# Patient Record
Sex: MALE | Race: BLACK OR AFRICAN AMERICAN | NOT HISPANIC OR LATINO | Employment: FULL TIME | URBAN - METROPOLITAN AREA
[De-identification: names, ages, dates, MRNs, and addresses within clinical notes are randomized per-mention and may not be internally consistent; named-entity substitution may affect disease eponyms.]

---

## 2017-01-21 ENCOUNTER — HOSPITAL ENCOUNTER (EMERGENCY)
Facility: HOSPITAL | Age: 32
Discharge: HOME | End: 2017-01-21
Attending: EMERGENCY MEDICINE

## 2017-01-21 VITALS
WEIGHT: 190 LBS | OXYGEN SATURATION: 99 % | RESPIRATION RATE: 18 BRPM | TEMPERATURE: 98 F | HEART RATE: 67 BPM | HEIGHT: 67 IN | BODY MASS INDEX: 29.82 KG/M2

## 2017-01-21 DIAGNOSIS — S81.812A LACERATION OF LEFT LEG, INITIAL ENCOUNTER: Primary | ICD-10-CM

## 2017-01-21 PROCEDURE — 99283 EMERGENCY DEPT VISIT LOW MDM: CPT

## 2017-01-21 RX ORDER — BACITRACIN, NEOMYCIN, POLYMYXIN B 400; 3.5; 5 [USP'U]/G; MG/G; [USP'U]/G
1 OINTMENT TOPICAL ONCE
Status: COMPLETED | OUTPATIENT
Start: 2017-01-21 | End: 2017-01-21

## 2017-01-21 RX ORDER — LIDOCAINE HYDROCHLORIDE AND EPINEPHRINE 10; 10 MG/ML; UG/ML
1 INJECTION, SOLUTION INFILTRATION; PERINEURAL ONCE
Status: COMPLETED | OUTPATIENT
Start: 2017-01-21 | End: 2017-01-21

## 2017-01-21 RX ADMIN — LIDOCAINE HYDROCHLORIDE AND EPINEPHRINE 1 ML: 10; 10 INJECTION, SOLUTION INFILTRATION; PERINEURAL at 18:10

## 2017-01-21 RX ADMIN — NEOMYCIN AND POLYMYXIN B SULFATES AND BACITRACIN ZINC 1 SMALL APPLICATION: 400; 3.5; 5 OINTMENT TOPICAL at 18:11

## 2019-05-10 ENCOUNTER — HOSPITAL ENCOUNTER (EMERGENCY)
Facility: HOSPITAL | Age: 34
Discharge: HOME | End: 2019-05-10
Attending: EMERGENCY MEDICINE | Admitting: EMERGENCY MEDICINE

## 2019-05-10 VITALS
SYSTOLIC BLOOD PRESSURE: 136 MMHG | OXYGEN SATURATION: 98 % | BODY MASS INDEX: 32.53 KG/M2 | HEIGHT: 67 IN | DIASTOLIC BLOOD PRESSURE: 82 MMHG | TEMPERATURE: 99.3 F | RESPIRATION RATE: 18 BRPM | WEIGHT: 207.23 LBS | HEART RATE: 82 BPM

## 2019-05-10 DIAGNOSIS — J02.0 STREP PHARYNGITIS: Primary | ICD-10-CM

## 2019-05-10 LAB — S PYO AG THROAT QL: POSITIVE

## 2019-05-10 PROCEDURE — 36415 COLL VENOUS BLD VENIPUNCTURE: CPT | Performed by: NURSE PRACTITIONER

## 2019-05-10 PROCEDURE — 87430 STREP A AG IA: CPT | Performed by: NURSE PRACTITIONER

## 2019-05-10 PROCEDURE — 86308 HETEROPHILE ANTIBODY SCREEN: CPT | Performed by: NURSE PRACTITIONER

## 2019-05-10 PROCEDURE — 99283 EMERGENCY DEPT VISIT LOW MDM: CPT | Performed by: NURSE PRACTITIONER

## 2019-05-10 PROCEDURE — 99283 EMERGENCY DEPT VISIT LOW MDM: CPT

## 2019-05-10 RX ORDER — MAGNESIUM HYDROXIDE/ALUMINUM HYDROXICE/SIMETHICONE 120; 1200; 1200 MG/30ML; MG/30ML; MG/30ML
30 SUSPENSION ORAL ONCE
Status: COMPLETED | OUTPATIENT
Start: 2019-05-10 | End: 2019-05-10

## 2019-05-10 RX ORDER — AMOXICILLIN 500 MG/1
500 CAPSULE ORAL 3 TIMES DAILY
Qty: 30 CAPSULE | Refills: 0 | Status: SHIPPED | OUTPATIENT
Start: 2019-05-10 | End: 2019-05-20

## 2019-05-10 RX ORDER — AMOXICILLIN 250 MG/1
1000 CAPSULE ORAL ONCE
Status: COMPLETED | OUTPATIENT
Start: 2019-05-10 | End: 2019-05-10

## 2019-05-10 RX ADMIN — ALUMINUM HYDROXIDE, MAGNESIUM HYDROXIDE, AND SIMETHICONE 30 ML: 200; 200; 20 SUSPENSION ORAL at 21:36

## 2019-05-10 RX ADMIN — AMOXICILLIN 1000 MG: 250 CAPSULE ORAL at 22:47

## 2019-05-10 RX ADMIN — DEXAMETHASONE SODIUM PHOSPHATE 10 MG: 10 INJECTION, SOLUTION INTRAMUSCULAR; INTRAVENOUS at 21:33

## 2019-05-10 RX ADMIN — DIPHENHYDRAMINE HYDROCHLORIDE 50 MG: 25 LIQUID ORAL at 21:37

## 2019-05-10 RX ADMIN — LIDOCAINE HYDROCHLORIDE 10 ML: 20 SOLUTION ORAL; TOPICAL at 21:37

## 2019-05-13 LAB — HETEROPH AB SER QL: NEGATIVE

## 2021-07-20 ENCOUNTER — APPOINTMENT (EMERGENCY)
Dept: RADIOLOGY | Facility: HOSPITAL | Age: 36
DRG: 177 | End: 2021-07-20
Payer: COMMERCIAL

## 2021-07-20 ENCOUNTER — HOSPITAL ENCOUNTER (INPATIENT)
Facility: HOSPITAL | Age: 36
LOS: 3 days | Discharge: HOME | DRG: 177 | End: 2021-07-23
Attending: EMERGENCY MEDICINE | Admitting: ANESTHESIOLOGY
Payer: COMMERCIAL

## 2021-07-20 DIAGNOSIS — E11.10 DKA (DIABETIC KETOACIDOSES): ICD-10-CM

## 2021-07-20 DIAGNOSIS — E11.10 DIABETIC KETOACIDOSIS WITHOUT COMA (HCC): Primary | ICD-10-CM

## 2021-07-20 DIAGNOSIS — U07.1 COVID-19: ICD-10-CM

## 2021-07-20 PROBLEM — E87.2 METABOLIC ACIDOSIS, INCREASED ANION GAP: Status: ACTIVE | Noted: 2021-07-20

## 2021-07-20 PROBLEM — E87.29 METABOLIC ACIDOSIS, INCREASED ANION GAP: Status: ACTIVE | Noted: 2021-07-20

## 2021-07-20 PROBLEM — N17.9 AKI (ACUTE KIDNEY INJURY) (HCC): Status: ACTIVE | Noted: 2021-07-20

## 2021-07-20 LAB
ABO GROUP BLD: NORMAL
ALBUMIN SERPL BCP-MCNC: 3.6 G/DL (ref 3.5–5)
ALP SERPL-CCNC: 73 U/L (ref 46–116)
ALT SERPL W P-5'-P-CCNC: 43 U/L (ref 12–78)
ANION GAP SERPL CALCULATED.3IONS-SCNC: 25 MMOL/L (ref 4–13)
ANION GAP SERPL CALCULATED.3IONS-SCNC: 25 MMOL/L (ref 4–13)
AST SERPL W P-5'-P-CCNC: 37 U/L (ref 5–45)
BASE EX.OXY STD BLDV CALC-SCNC: 62.1 % (ref 60–80)
BASE EXCESS BLDV CALC-SCNC: -16.4 MMOL/L
BASOPHILS # BLD AUTO: 0.01 THOUSANDS/ΜL (ref 0–0.1)
BASOPHILS NFR BLD AUTO: 0 % (ref 0–1)
BETA-HYDROXYBUTYRATE: 6.7 MMOL/L
BILIRUB SERPL-MCNC: 0.63 MG/DL (ref 0.2–1)
BLD GP AB SCN SERPL QL: NEGATIVE
BUN SERPL-MCNC: 12 MG/DL (ref 5–25)
BUN SERPL-MCNC: 12 MG/DL (ref 5–25)
CALCIUM SERPL-MCNC: 7.7 MG/DL (ref 8.3–10.1)
CALCIUM SERPL-MCNC: 9.1 MG/DL (ref 8.3–10.1)
CHLORIDE SERPL-SCNC: 91 MMOL/L (ref 100–108)
CHLORIDE SERPL-SCNC: 96 MMOL/L (ref 100–108)
CO2 SERPL-SCNC: 10 MMOL/L (ref 21–32)
CO2 SERPL-SCNC: 14 MMOL/L (ref 21–32)
CREAT SERPL-MCNC: 1.69 MG/DL (ref 0.6–1.3)
CREAT SERPL-MCNC: 1.91 MG/DL (ref 0.6–1.3)
CRP SERPL QL: 187.4 MG/L
D DIMER PPP FEU-MCNC: 0.44 UG/ML FEU
EOSINOPHIL # BLD AUTO: 0 THOUSAND/ΜL (ref 0–0.61)
EOSINOPHIL NFR BLD AUTO: 0 % (ref 0–6)
ERYTHROCYTE [DISTWIDTH] IN BLOOD BY AUTOMATED COUNT: 12.3 % (ref 11.6–15.1)
GFR SERPL CREATININE-BSD FRML MDRD: 52 ML/MIN/1.73SQ M
GFR SERPL CREATININE-BSD FRML MDRD: 60 ML/MIN/1.73SQ M
GLUCOSE SERPL-MCNC: 297 MG/DL (ref 65–140)
GLUCOSE SERPL-MCNC: 369 MG/DL (ref 65–140)
GLUCOSE SERPL-MCNC: 372 MG/DL (ref 65–140)
GLUCOSE SERPL-MCNC: 388 MG/DL (ref 65–140)
GLUCOSE SERPL-MCNC: 390 MG/DL (ref 65–140)
HCO3 BLDV-SCNC: 11 MMOL/L (ref 24–30)
HCT VFR BLD AUTO: 47.8 % (ref 36.5–49.3)
HGB BLD-MCNC: 16.1 G/DL (ref 12–17)
IMM GRANULOCYTES # BLD AUTO: 0.05 THOUSAND/UL (ref 0–0.2)
IMM GRANULOCYTES NFR BLD AUTO: 1 % (ref 0–2)
LACTATE SERPL-SCNC: 3.9 MMOL/L (ref 0.5–2)
LDH SERPL-CCNC: 317 U/L (ref 81–234)
LYMPHOCYTES # BLD AUTO: 0.8 THOUSANDS/ΜL (ref 0.6–4.47)
LYMPHOCYTES NFR BLD AUTO: 10 % (ref 14–44)
MAGNESIUM SERPL-MCNC: 2 MG/DL (ref 1.6–2.6)
MCH RBC QN AUTO: 29.9 PG (ref 26.8–34.3)
MCHC RBC AUTO-ENTMCNC: 33.7 G/DL (ref 31.4–37.4)
MCV RBC AUTO: 89 FL (ref 82–98)
MONOCYTES # BLD AUTO: 0.52 THOUSAND/ΜL (ref 0.17–1.22)
MONOCYTES NFR BLD AUTO: 6 % (ref 4–12)
NEUTROPHILS # BLD AUTO: 6.7 THOUSANDS/ΜL (ref 1.85–7.62)
NEUTS SEG NFR BLD AUTO: 83 % (ref 43–75)
NRBC BLD AUTO-RTO: 0 /100 WBCS
NT-PROBNP SERPL-MCNC: 10 PG/ML
O2 CT BLDV-SCNC: 14.3 ML/DL
PCO2 BLDV: 31.5 MM HG (ref 42–50)
PH BLDV: 7.16 [PH] (ref 7.3–7.4)
PHOSPHATE SERPL-MCNC: 3.9 MG/DL (ref 2.7–4.5)
PLATELET # BLD AUTO: 202 THOUSANDS/UL (ref 149–390)
PMV BLD AUTO: 10.9 FL (ref 8.9–12.7)
PO2 BLDV: 36.2 MM HG (ref 35–45)
POTASSIUM SERPL-SCNC: 4.8 MMOL/L (ref 3.5–5.3)
POTASSIUM SERPL-SCNC: 5 MMOL/L (ref 3.5–5.3)
PROT SERPL-MCNC: 9.2 G/DL (ref 6.4–8.2)
RBC # BLD AUTO: 5.39 MILLION/UL (ref 3.88–5.62)
RH BLD: POSITIVE
SARS-COV-2 RNA RESP QL NAA+PROBE: POSITIVE
SODIUM SERPL-SCNC: 130 MMOL/L (ref 136–145)
SODIUM SERPL-SCNC: 131 MMOL/L (ref 136–145)
SPECIMEN EXPIRATION DATE: NORMAL
TROPONIN I SERPL-MCNC: <0.02 NG/ML
WBC # BLD AUTO: 8.08 THOUSAND/UL (ref 4.31–10.16)

## 2021-07-20 PROCEDURE — 83735 ASSAY OF MAGNESIUM: CPT | Performed by: NURSE PRACTITIONER

## 2021-07-20 PROCEDURE — 82010 KETONE BODYS QUAN: CPT | Performed by: EMERGENCY MEDICINE

## 2021-07-20 PROCEDURE — 84145 PROCALCITONIN (PCT): CPT | Performed by: EMERGENCY MEDICINE

## 2021-07-20 PROCEDURE — 86337 INSULIN ANTIBODIES: CPT | Performed by: NURSE PRACTITIONER

## 2021-07-20 PROCEDURE — 86900 BLOOD TYPING SEROLOGIC ABO: CPT | Performed by: NURSE PRACTITIONER

## 2021-07-20 PROCEDURE — 71045 X-RAY EXAM CHEST 1 VIEW: CPT

## 2021-07-20 PROCEDURE — 85384 FIBRINOGEN ACTIVITY: CPT | Performed by: EMERGENCY MEDICINE

## 2021-07-20 PROCEDURE — 82550 ASSAY OF CK (CPK): CPT | Performed by: NURSE PRACTITIONER

## 2021-07-20 PROCEDURE — 82948 REAGENT STRIP/BLOOD GLUCOSE: CPT

## 2021-07-20 PROCEDURE — 87040 BLOOD CULTURE FOR BACTERIA: CPT | Performed by: EMERGENCY MEDICINE

## 2021-07-20 PROCEDURE — 82805 BLOOD GASES W/O2 SATURATION: CPT | Performed by: NURSE PRACTITIONER

## 2021-07-20 PROCEDURE — 82728 ASSAY OF FERRITIN: CPT | Performed by: EMERGENCY MEDICINE

## 2021-07-20 PROCEDURE — 81001 URINALYSIS AUTO W/SCOPE: CPT | Performed by: NURSE PRACTITIONER

## 2021-07-20 PROCEDURE — 80053 COMPREHEN METABOLIC PANEL: CPT | Performed by: EMERGENCY MEDICINE

## 2021-07-20 PROCEDURE — 82553 CREATINE MB FRACTION: CPT | Performed by: NURSE PRACTITIONER

## 2021-07-20 PROCEDURE — 83519 RIA NONANTIBODY: CPT | Performed by: NURSE PRACTITIONER

## 2021-07-20 PROCEDURE — U0003 INFECTIOUS AGENT DETECTION BY NUCLEIC ACID (DNA OR RNA); SEVERE ACUTE RESPIRATORY SYNDROME CORONAVIRUS 2 (SARS-COV-2) (CORONAVIRUS DISEASE [COVID-19]), AMPLIFIED PROBE TECHNIQUE, MAKING USE OF HIGH THROUGHPUT TECHNOLOGIES AS DESCRIBED BY CMS-2020-01-R: HCPCS | Performed by: EMERGENCY MEDICINE

## 2021-07-20 PROCEDURE — 84484 ASSAY OF TROPONIN QUANT: CPT | Performed by: EMERGENCY MEDICINE

## 2021-07-20 PROCEDURE — 94644 CONT INHLJ TX 1ST HOUR: CPT

## 2021-07-20 PROCEDURE — 82805 BLOOD GASES W/O2 SATURATION: CPT | Performed by: EMERGENCY MEDICINE

## 2021-07-20 PROCEDURE — 83520 IMMUNOASSAY QUANT NOS NONAB: CPT | Performed by: EMERGENCY MEDICINE

## 2021-07-20 PROCEDURE — 83605 ASSAY OF LACTIC ACID: CPT | Performed by: NURSE PRACTITIONER

## 2021-07-20 PROCEDURE — 83615 LACTATE (LD) (LDH) ENZYME: CPT | Performed by: EMERGENCY MEDICINE

## 2021-07-20 PROCEDURE — 94664 DEMO&/EVAL PT USE INHALER: CPT

## 2021-07-20 PROCEDURE — 83880 ASSAY OF NATRIURETIC PEPTIDE: CPT | Performed by: EMERGENCY MEDICINE

## 2021-07-20 PROCEDURE — 85025 COMPLETE CBC W/AUTO DIFF WBC: CPT | Performed by: EMERGENCY MEDICINE

## 2021-07-20 PROCEDURE — 36415 COLL VENOUS BLD VENIPUNCTURE: CPT | Performed by: EMERGENCY MEDICINE

## 2021-07-20 PROCEDURE — 94760 N-INVAS EAR/PLS OXIMETRY 1: CPT

## 2021-07-20 PROCEDURE — 83036 HEMOGLOBIN GLYCOSYLATED A1C: CPT | Performed by: NURSE PRACTITIONER

## 2021-07-20 PROCEDURE — 84443 ASSAY THYROID STIM HORMONE: CPT | Performed by: NURSE PRACTITIONER

## 2021-07-20 PROCEDURE — 99291 CRITICAL CARE FIRST HOUR: CPT | Performed by: NURSE PRACTITIONER

## 2021-07-20 PROCEDURE — 86140 C-REACTIVE PROTEIN: CPT | Performed by: EMERGENCY MEDICINE

## 2021-07-20 PROCEDURE — XW033E5 INTRODUCTION OF REMDESIVIR ANTI-INFECTIVE INTO PERIPHERAL VEIN, PERCUTANEOUS APPROACH, NEW TECHNOLOGY GROUP 5: ICD-10-PCS | Performed by: EMERGENCY MEDICINE

## 2021-07-20 PROCEDURE — 96374 THER/PROPH/DIAG INJ IV PUSH: CPT

## 2021-07-20 PROCEDURE — 84100 ASSAY OF PHOSPHORUS: CPT | Performed by: NURSE PRACTITIONER

## 2021-07-20 PROCEDURE — U0005 INFEC AGEN DETEC AMPLI PROBE: HCPCS | Performed by: EMERGENCY MEDICINE

## 2021-07-20 PROCEDURE — 86850 RBC ANTIBODY SCREEN: CPT | Performed by: NURSE PRACTITIONER

## 2021-07-20 PROCEDURE — 96361 HYDRATE IV INFUSION ADD-ON: CPT

## 2021-07-20 PROCEDURE — 86901 BLOOD TYPING SEROLOGIC RH(D): CPT | Performed by: NURSE PRACTITIONER

## 2021-07-20 PROCEDURE — 80048 BASIC METABOLIC PNL TOTAL CA: CPT | Performed by: NURSE PRACTITIONER

## 2021-07-20 PROCEDURE — 85379 FIBRIN DEGRADATION QUANT: CPT | Performed by: EMERGENCY MEDICINE

## 2021-07-20 PROCEDURE — 93005 ELECTROCARDIOGRAM TRACING: CPT

## 2021-07-20 PROCEDURE — 99291 CRITICAL CARE FIRST HOUR: CPT

## 2021-07-20 PROCEDURE — 99291 CRITICAL CARE FIRST HOUR: CPT | Performed by: EMERGENCY MEDICINE

## 2021-07-20 RX ORDER — METHYLPREDNISOLONE SODIUM SUCCINATE 125 MG/2ML
125 INJECTION, POWDER, LYOPHILIZED, FOR SOLUTION INTRAMUSCULAR; INTRAVENOUS ONCE
Status: COMPLETED | OUTPATIENT
Start: 2021-07-20 | End: 2021-07-20

## 2021-07-20 RX ORDER — DEXTROSE AND SODIUM CHLORIDE 5; .9 G/100ML; G/100ML
250 INJECTION, SOLUTION INTRAVENOUS CONTINUOUS
Status: DISCONTINUED | OUTPATIENT
Start: 2021-07-20 | End: 2021-07-21

## 2021-07-20 RX ORDER — SODIUM CHLORIDE 9 MG/ML
500 INJECTION, SOLUTION INTRAVENOUS CONTINUOUS
Status: DISCONTINUED | OUTPATIENT
Start: 2021-07-20 | End: 2021-07-21

## 2021-07-20 RX ORDER — SODIUM CHLORIDE 9 MG/ML
250 INJECTION, SOLUTION INTRAVENOUS CONTINUOUS
Status: DISCONTINUED | OUTPATIENT
Start: 2021-07-21 | End: 2021-07-21

## 2021-07-20 RX ORDER — MELATONIN
2000 DAILY
Status: DISCONTINUED | OUTPATIENT
Start: 2021-07-21 | End: 2021-07-23 | Stop reason: HOSPADM

## 2021-07-20 RX ORDER — MULTIVITAMIN/IRON/FOLIC ACID 18MG-0.4MG
1 TABLET ORAL DAILY
Status: DISCONTINUED | OUTPATIENT
Start: 2021-07-28 | End: 2021-07-23 | Stop reason: HOSPADM

## 2021-07-20 RX ORDER — HEPARIN SODIUM 5000 [USP'U]/ML
5000 INJECTION, SOLUTION INTRAVENOUS; SUBCUTANEOUS EVERY 8 HOURS SCHEDULED
Status: DISCONTINUED | OUTPATIENT
Start: 2021-07-20 | End: 2021-07-21

## 2021-07-20 RX ORDER — ASCORBIC ACID 500 MG
1000 TABLET ORAL EVERY 12 HOURS SCHEDULED
Status: DISCONTINUED | OUTPATIENT
Start: 2021-07-20 | End: 2021-07-23 | Stop reason: HOSPADM

## 2021-07-20 RX ORDER — SODIUM CHLORIDE FOR INHALATION 0.9 %
3 VIAL, NEBULIZER (ML) INHALATION ONCE
Status: COMPLETED | OUTPATIENT
Start: 2021-07-20 | End: 2021-07-20

## 2021-07-20 RX ORDER — AMOXICILLIN 250 MG
2 CAPSULE ORAL 2 TIMES DAILY
Status: DISCONTINUED | OUTPATIENT
Start: 2021-07-20 | End: 2021-07-23 | Stop reason: HOSPADM

## 2021-07-20 RX ORDER — ZINC SULFATE 50(220)MG
220 CAPSULE ORAL DAILY
Status: DISCONTINUED | OUTPATIENT
Start: 2021-07-21 | End: 2021-07-23 | Stop reason: HOSPADM

## 2021-07-20 RX ORDER — POLYETHYLENE GLYCOL 3350 17 G/17G
17 POWDER, FOR SOLUTION ORAL DAILY
Status: DISCONTINUED | OUTPATIENT
Start: 2021-07-21 | End: 2021-07-23 | Stop reason: HOSPADM

## 2021-07-20 RX ORDER — HEPARIN SODIUM 5000 [USP'U]/ML
5000 INJECTION, SOLUTION INTRAVENOUS; SUBCUTANEOUS EVERY 8 HOURS SCHEDULED
Status: DISCONTINUED | OUTPATIENT
Start: 2021-07-20 | End: 2021-07-20

## 2021-07-20 RX ADMIN — REMDESIVIR 200 MG: 100 INJECTION, POWDER, LYOPHILIZED, FOR SOLUTION INTRAVENOUS at 22:14

## 2021-07-20 RX ADMIN — IPRATROPIUM BROMIDE 1 MG: 0.5 SOLUTION RESPIRATORY (INHALATION) at 20:51

## 2021-07-20 RX ADMIN — ISODIUM CHLORIDE 3 ML: 0.03 SOLUTION RESPIRATORY (INHALATION) at 20:51

## 2021-07-20 RX ADMIN — OXYCODONE HYDROCHLORIDE AND ACETAMINOPHEN 1000 MG: 500 TABLET ORAL at 22:56

## 2021-07-20 RX ADMIN — METHYLPREDNISOLONE SODIUM SUCCINATE 125 MG: 125 INJECTION, POWDER, FOR SOLUTION INTRAMUSCULAR; INTRAVENOUS at 20:30

## 2021-07-20 RX ADMIN — SODIUM CHLORIDE 1000 ML: 0.9 INJECTION, SOLUTION INTRAVENOUS at 21:48

## 2021-07-20 RX ADMIN — HEPARIN SODIUM 5000 UNITS: 5000 INJECTION INTRAVENOUS; SUBCUTANEOUS at 22:56

## 2021-07-20 RX ADMIN — SODIUM CHLORIDE 8.2 UNITS/HR: 9 INJECTION, SOLUTION INTRAVENOUS at 22:39

## 2021-07-20 RX ADMIN — ALBUTEROL SULFATE 10 MG: 2.5 SOLUTION RESPIRATORY (INHALATION) at 20:51

## 2021-07-20 RX ADMIN — SODIUM CHLORIDE 500 ML/HR: 0.9 INJECTION, SOLUTION INTRAVENOUS at 22:14

## 2021-07-20 RX ADMIN — SODIUM CHLORIDE 1000 ML: 0.9 INJECTION, SOLUTION INTRAVENOUS at 21:08

## 2021-07-21 LAB
ABO GROUP BLD: NORMAL
ANION GAP SERPL CALCULATED.3IONS-SCNC: 13 MMOL/L (ref 4–13)
ANION GAP SERPL CALCULATED.3IONS-SCNC: 13 MMOL/L (ref 4–13)
ATRIAL RATE: 123 BPM
BACTERIA UR QL AUTO: ABNORMAL /HPF
BASE EX.OXY STD BLDV CALC-SCNC: 62.9 % (ref 60–80)
BASE EX.OXY STD BLDV CALC-SCNC: 72.3 % (ref 60–80)
BASE EXCESS BLDV CALC-SCNC: -13.5 MMOL/L
BASE EXCESS BLDV CALC-SCNC: -21.2 MMOL/L
BASE EXCESS BLDV CALC-SCNC: -8.1 MMOL/L
BASE EXCESS BLDV CALC-SCNC: -9.3 MMOL/L
BILIRUB UR QL STRIP: NEGATIVE
BODY TEMPERATURE: 98 DEGREES FEHRENHEIT
BUN SERPL-MCNC: 7 MG/DL (ref 5–25)
BUN SERPL-MCNC: 9 MG/DL (ref 5–25)
CA-I BLD-SCNC: 1.08 MMOL/L (ref 1.12–1.32)
CALCIUM SERPL-MCNC: 7 MG/DL (ref 8.3–10.1)
CALCIUM SERPL-MCNC: 7.3 MG/DL (ref 8.3–10.1)
CHLORIDE SERPL-SCNC: 106 MMOL/L (ref 100–108)
CHLORIDE SERPL-SCNC: 107 MMOL/L (ref 100–108)
CHOLEST SERPL-MCNC: 138 MG/DL (ref 50–200)
CK MB SERPL-MCNC: <1 % (ref 0–2.5)
CK MB SERPL-MCNC: <1 NG/ML (ref 0–5)
CK SERPL-CCNC: 436 U/L (ref 39–308)
CLARITY UR: CLEAR
CO2 SERPL-SCNC: 19 MMOL/L (ref 21–32)
CO2 SERPL-SCNC: 19 MMOL/L (ref 21–32)
COLOR UR: YELLOW
CREAT SERPL-MCNC: 1.08 MG/DL (ref 0.6–1.3)
CREAT SERPL-MCNC: 1.26 MG/DL (ref 0.6–1.3)
EST. AVERAGE GLUCOSE BLD GHB EST-MCNC: 324 MG/DL
FERRITIN SERPL-MCNC: 1112 NG/ML (ref 8–388)
FIBRINOGEN PPP-MCNC: 802 MG/DL (ref 227–495)
FINE GRAN CASTS URNS QL MICRO: ABNORMAL /LPF
GFR SERPL CREATININE-BSD FRML MDRD: 104 ML/MIN/1.73SQ M
GFR SERPL CREATININE-BSD FRML MDRD: 86 ML/MIN/1.73SQ M
GLUCOSE SERPL-MCNC: 109 MG/DL (ref 65–140)
GLUCOSE SERPL-MCNC: 113 MG/DL (ref 65–140)
GLUCOSE SERPL-MCNC: 119 MG/DL (ref 65–140)
GLUCOSE SERPL-MCNC: 125 MG/DL (ref 65–140)
GLUCOSE SERPL-MCNC: 145 MG/DL (ref 65–140)
GLUCOSE SERPL-MCNC: 151 MG/DL (ref 65–140)
GLUCOSE SERPL-MCNC: 191 MG/DL (ref 65–140)
GLUCOSE SERPL-MCNC: 194 MG/DL (ref 65–140)
GLUCOSE SERPL-MCNC: 195 MG/DL (ref 65–140)
GLUCOSE SERPL-MCNC: 202 MG/DL (ref 65–140)
GLUCOSE SERPL-MCNC: 207 MG/DL (ref 65–140)
GLUCOSE SERPL-MCNC: 217 MG/DL (ref 65–140)
GLUCOSE SERPL-MCNC: 234 MG/DL (ref 65–140)
GLUCOSE SERPL-MCNC: 259 MG/DL (ref 65–140)
GLUCOSE SERPL-MCNC: 286 MG/DL (ref 65–140)
GLUCOSE SERPL-MCNC: 66 MG/DL (ref 65–140)
GLUCOSE SERPL-MCNC: 90 MG/DL (ref 65–140)
GLUCOSE SERPL-MCNC: 91 MG/DL (ref 65–140)
GLUCOSE UR STRIP-MCNC: ABNORMAL MG/DL
HBA1C MFR BLD: 12.9 %
HCO3 BLDV-SCNC: 12.4 MMOL/L (ref 24–30)
HCO3 BLDV-SCNC: 16.9 MMOL/L (ref 24–30)
HCO3 BLDV-SCNC: 17.3 MMOL/L (ref 24–30)
HCO3 BLDV-SCNC: 8.1 MMOL/L (ref 24–30)
HDLC SERPL-MCNC: 37 MG/DL
HGB UR QL STRIP.AUTO: ABNORMAL
KETONES UR STRIP-MCNC: ABNORMAL MG/DL
LACTATE SERPL-SCNC: 1.6 MMOL/L (ref 0.5–2)
LACTATE SERPL-SCNC: 2.3 MMOL/L (ref 0.5–2)
LACTATE SERPL-SCNC: 2.4 MMOL/L (ref 0.5–2)
LDLC SERPL CALC-MCNC: 88 MG/DL (ref 0–100)
LEUKOCYTE ESTERASE UR QL STRIP: ABNORMAL
MAGNESIUM SERPL-MCNC: 2 MG/DL (ref 1.6–2.6)
MAGNESIUM SERPL-MCNC: 2.7 MG/DL (ref 1.6–2.6)
NITRITE UR QL STRIP: NEGATIVE
NON-SQ EPI CELLS URNS QL MICRO: ABNORMAL /HPF
O2 CT BLDV-SCNC: 11.5 ML/DL
O2 CT BLDV-SCNC: 13.3 ML/DL
P AXIS: 73 DEGREES
PCO2 BLDV: 29.6 MM HG (ref 42–50)
PCO2 BLDV: 29.7 MM HG (ref 42–50)
PCO2 BLDV: 34.9 MM HG (ref 42–50)
PCO2 BLDV: 37.7 MM HG (ref 42–50)
PH BLDV: 7.05 [PH] (ref 7.3–7.4)
PH BLDV: 7.24 [PH] (ref 7.3–7.4)
PH BLDV: 7.27 [PH] (ref 7.3–7.4)
PH BLDV: 7.31 [PH] (ref 7.3–7.4)
PH UR STRIP.AUTO: 6 [PH]
PHOSPHATE SERPL-MCNC: 0.9 MG/DL (ref 2.7–4.5)
PHOSPHATE SERPL-MCNC: 1.6 MG/DL (ref 2.7–4.5)
PO2 BLDV: 30 MM HG (ref 35–45)
PO2 BLDV: 38.6 MM HG (ref 35–45)
PO2 BLDV: 52.7 MM HG (ref 35–45)
PO2 BLDV: 80.5 MM HG (ref 35–45)
POTASSIUM SERPL-SCNC: 3.6 MMOL/L (ref 3.5–5.3)
POTASSIUM SERPL-SCNC: 3.7 MMOL/L (ref 3.5–5.3)
PR INTERVAL: 120 MS
PROCALCITONIN SERPL-MCNC: 0.2 NG/ML
PROCALCITONIN SERPL-MCNC: 0.55 NG/ML
PROT UR STRIP-MCNC: ABNORMAL MG/DL
QRS AXIS: 65 DEGREES
QRSD INTERVAL: 86 MS
QT INTERVAL: 348 MS
QTC INTERVAL: 498 MS
RBC #/AREA URNS AUTO: ABNORMAL /HPF
RH BLD: POSITIVE
SODIUM SERPL-SCNC: 138 MMOL/L (ref 136–145)
SODIUM SERPL-SCNC: 139 MMOL/L (ref 136–145)
SP GR UR STRIP.AUTO: >=1.03 (ref 1–1.03)
T WAVE AXIS: 13 DEGREES
TRIGL SERPL-MCNC: 64 MG/DL
TSH SERPL DL<=0.05 MIU/L-ACNC: 1.31 UIU/ML (ref 0.36–3.74)
UROBILINOGEN UR QL STRIP.AUTO: 0.2 E.U./DL
VENTRICULAR RATE: 123 BPM
WBC #/AREA URNS AUTO: ABNORMAL /HPF

## 2021-07-21 PROCEDURE — 99254 IP/OBS CNSLTJ NEW/EST MOD 60: CPT | Performed by: INTERNAL MEDICINE

## 2021-07-21 PROCEDURE — 82948 REAGENT STRIP/BLOOD GLUCOSE: CPT

## 2021-07-21 PROCEDURE — 83605 ASSAY OF LACTIC ACID: CPT | Performed by: NURSE PRACTITIONER

## 2021-07-21 PROCEDURE — 80048 BASIC METABOLIC PNL TOTAL CA: CPT | Performed by: NURSE PRACTITIONER

## 2021-07-21 PROCEDURE — 84145 PROCALCITONIN (PCT): CPT | Performed by: EMERGENCY MEDICINE

## 2021-07-21 PROCEDURE — 82330 ASSAY OF CALCIUM: CPT | Performed by: NURSE PRACTITIONER

## 2021-07-21 PROCEDURE — 84100 ASSAY OF PHOSPHORUS: CPT | Performed by: NURSE PRACTITIONER

## 2021-07-21 PROCEDURE — 99291 CRITICAL CARE FIRST HOUR: CPT | Performed by: NURSE PRACTITIONER

## 2021-07-21 PROCEDURE — 3046F HEMOGLOBIN A1C LEVEL >9.0%: CPT | Performed by: FAMILY MEDICINE

## 2021-07-21 PROCEDURE — 82805 BLOOD GASES W/O2 SATURATION: CPT | Performed by: NURSE PRACTITIONER

## 2021-07-21 PROCEDURE — 80061 LIPID PANEL: CPT | Performed by: PHYSICIAN ASSISTANT

## 2021-07-21 PROCEDURE — 83735 ASSAY OF MAGNESIUM: CPT | Performed by: NURSE PRACTITIONER

## 2021-07-21 PROCEDURE — 93010 ELECTROCARDIOGRAM REPORT: CPT | Performed by: INTERNAL MEDICINE

## 2021-07-21 RX ORDER — SODIUM CHLORIDE, SODIUM GLUCONATE, SODIUM ACETATE, POTASSIUM CHLORIDE, MAGNESIUM CHLORIDE, SODIUM PHOSPHATE, DIBASIC, AND POTASSIUM PHOSPHATE .53; .5; .37; .037; .03; .012; .00082 G/100ML; G/100ML; G/100ML; G/100ML; G/100ML; G/100ML; G/100ML
150 INJECTION, SOLUTION INTRAVENOUS CONTINUOUS
Status: DISPENSED | OUTPATIENT
Start: 2021-07-21 | End: 2021-07-22

## 2021-07-21 RX ORDER — POTASSIUM CHLORIDE 14.9 MG/ML
20 INJECTION INTRAVENOUS
Status: COMPLETED | OUTPATIENT
Start: 2021-07-21 | End: 2021-07-21

## 2021-07-21 RX ORDER — SODIUM CHLORIDE, SODIUM GLUCONATE, SODIUM ACETATE, POTASSIUM CHLORIDE, MAGNESIUM CHLORIDE, SODIUM PHOSPHATE, DIBASIC, AND POTASSIUM PHOSPHATE .53; .5; .37; .037; .03; .012; .00082 G/100ML; G/100ML; G/100ML; G/100ML; G/100ML; G/100ML; G/100ML
150 INJECTION, SOLUTION INTRAVENOUS CONTINUOUS
Status: DISCONTINUED | OUTPATIENT
Start: 2021-07-21 | End: 2021-07-21

## 2021-07-21 RX ORDER — MAGNESIUM SULFATE HEPTAHYDRATE 40 MG/ML
2 INJECTION, SOLUTION INTRAVENOUS ONCE
Status: COMPLETED | OUTPATIENT
Start: 2021-07-21 | End: 2021-07-21

## 2021-07-21 RX ORDER — POTASSIUM CHLORIDE 20 MEQ/1
40 TABLET, EXTENDED RELEASE ORAL ONCE
Status: COMPLETED | OUTPATIENT
Start: 2021-07-21 | End: 2021-07-21

## 2021-07-21 RX ORDER — CALCIUM GLUCONATE 20 MG/ML
2 INJECTION, SOLUTION INTRAVENOUS ONCE
Status: COMPLETED | OUTPATIENT
Start: 2021-07-21 | End: 2021-07-21

## 2021-07-21 RX ORDER — INSULIN GLARGINE 100 [IU]/ML
30 INJECTION, SOLUTION SUBCUTANEOUS
Status: DISCONTINUED | OUTPATIENT
Start: 2021-07-21 | End: 2021-07-23 | Stop reason: HOSPADM

## 2021-07-21 RX ADMIN — DEXTROSE AND SODIUM CHLORIDE 250 ML/HR: 5; .9 INJECTION, SOLUTION INTRAVENOUS at 02:00

## 2021-07-21 RX ADMIN — SODIUM CHLORIDE, SODIUM GLUCONATE, SODIUM ACETATE, POTASSIUM CHLORIDE, MAGNESIUM CHLORIDE, SODIUM PHOSPHATE, DIBASIC, AND POTASSIUM PHOSPHATE 150 ML/HR: .53; .5; .37; .037; .03; .012; .00082 INJECTION, SOLUTION INTRAVENOUS at 09:14

## 2021-07-21 RX ADMIN — SODIUM CHLORIDE, SODIUM GLUCONATE, SODIUM ACETATE, POTASSIUM CHLORIDE, MAGNESIUM CHLORIDE, SODIUM PHOSPHATE, DIBASIC, AND POTASSIUM PHOSPHATE 150 ML/HR: .53; .5; .37; .037; .03; .012; .00082 INJECTION, SOLUTION INTRAVENOUS at 13:12

## 2021-07-21 RX ADMIN — REMDESIVIR 100 MG: 100 INJECTION, POWDER, LYOPHILIZED, FOR SOLUTION INTRAVENOUS at 22:09

## 2021-07-21 RX ADMIN — SODIUM CHLORIDE, SODIUM GLUCONATE, SODIUM ACETATE, POTASSIUM CHLORIDE, MAGNESIUM CHLORIDE, SODIUM PHOSPHATE, DIBASIC, AND POTASSIUM PHOSPHATE 150 ML/HR: .53; .5; .37; .037; .03; .012; .00082 INJECTION, SOLUTION INTRAVENOUS at 15:59

## 2021-07-21 RX ADMIN — CALCIUM GLUCONATE 2 G: 20 INJECTION, SOLUTION INTRAVENOUS at 13:39

## 2021-07-21 RX ADMIN — SODIUM PHOSPHATE, MONOBASIC, MONOHYDRATE 30 MMOL: 276; 142 INJECTION, SOLUTION INTRAVENOUS at 10:15

## 2021-07-21 RX ADMIN — POTASSIUM CHLORIDE 20 MEQ: 14.9 INJECTION, SOLUTION INTRAVENOUS at 10:13

## 2021-07-21 RX ADMIN — SODIUM CHLORIDE 30 UNITS/HR: 9 INJECTION, SOLUTION INTRAVENOUS at 08:55

## 2021-07-21 RX ADMIN — ENOXAPARIN SODIUM 40 MG: 40 INJECTION SUBCUTANEOUS at 11:46

## 2021-07-21 RX ADMIN — ZINC SULFATE 220 MG (50 MG) CAPSULE 220 MG: CAPSULE at 08:58

## 2021-07-21 RX ADMIN — DOXYCYCLINE 100 MG: 100 INJECTION, POWDER, LYOPHILIZED, FOR SOLUTION INTRAVENOUS at 10:56

## 2021-07-21 RX ADMIN — OXYCODONE HYDROCHLORIDE AND ACETAMINOPHEN 1000 MG: 500 TABLET ORAL at 20:00

## 2021-07-21 RX ADMIN — INSULIN GLARGINE 30 UNITS: 100 INJECTION, SOLUTION SUBCUTANEOUS at 22:07

## 2021-07-21 RX ADMIN — POTASSIUM CHLORIDE 40 MEQ: 1500 TABLET, EXTENDED RELEASE ORAL at 13:39

## 2021-07-21 RX ADMIN — Medication 2000 UNITS: at 08:58

## 2021-07-21 RX ADMIN — CEFTRIAXONE SODIUM 1000 MG: 10 INJECTION, POWDER, FOR SOLUTION INTRAVENOUS at 00:55

## 2021-07-21 RX ADMIN — POTASSIUM CHLORIDE 20 MEQ: 14.9 INJECTION, SOLUTION INTRAVENOUS at 08:55

## 2021-07-21 RX ADMIN — DOXYCYCLINE 100 MG: 100 INJECTION, POWDER, LYOPHILIZED, FOR SOLUTION INTRAVENOUS at 22:30

## 2021-07-21 RX ADMIN — DOXYCYCLINE 100 MG: 100 INJECTION, POWDER, LYOPHILIZED, FOR SOLUTION INTRAVENOUS at 00:01

## 2021-07-21 RX ADMIN — OXYCODONE HYDROCHLORIDE AND ACETAMINOPHEN 1000 MG: 500 TABLET ORAL at 08:58

## 2021-07-21 RX ADMIN — MAGNESIUM SULFATE HEPTAHYDRATE 2 G: 40 INJECTION, SOLUTION INTRAVENOUS at 09:11

## 2021-07-21 RX ADMIN — HEPARIN SODIUM 5000 UNITS: 5000 INJECTION INTRAVENOUS; SUBCUTANEOUS at 05:56

## 2021-07-21 RX ADMIN — SODIUM CHLORIDE 3 UNITS/HR: 9 INJECTION, SOLUTION INTRAVENOUS at 12:28

## 2021-07-21 RX ADMIN — SODIUM CHLORIDE 0.5 UNITS/HR: 9 INJECTION, SOLUTION INTRAVENOUS at 10:58

## 2021-07-21 RX ADMIN — CEFTRIAXONE SODIUM 1000 MG: 10 INJECTION, POWDER, FOR SOLUTION INTRAVENOUS at 22:30

## 2021-07-21 NOTE — ASSESSMENT & PLAN NOTE
· Presented with SOB x5 days accompanied by dry cough  · Denies any recent sick contacts   · Has not been vaccinated   · D-dimer 0 44  · Inflammatory markers, continue to trend  · Procal pending  · CXR unrevealing  · COVID Mild Pathway   · Diagnosed 7/20, symptom onset 7/15  · O2: RA  · Vitamin C/D/Zinc started 7/20  · Steroids: n/a  · ABX: Doxy/Ceftriaxone D2  · Convalescent plasma: Out of window based on symptom onset   · Remdesivir: Started 7/20 in the setting of DM risk factor  · Actemra: n/a  · Anticoagulation: Heparin SQ  · Veletri: n/a  · Proned: Encourage self proning  · Provide supplemental oxygen to maintain goal SpO2 > 92%  · Encourage pulmonary hygiene with IS and OOB during the day

## 2021-07-21 NOTE — CONSULTS
TeleConsultation - Endocrine  Juan Parker 37 y o  male MRN: [id number]  Unit/Bed#:  Encounter: [id number]        REQUIRED DOCUMENTATION:     1  This service was provided via Telemedicine  2  Provider located at [facility] in [facility]  3  TeleMed provider: Elizabeth Krishnan MD   4  Identify all parties in room with patient during tele consult:   the patient  5  Patient was then informed that this was a Telemedicine visit and that the exam was being conducted confidentially over secure lines  My office door was closed  No one else was in the room  Patient acknowledged consent and understanding of privacy and security of the Telemedicine visit, and gave us permission to have the assistant stay in the room in order to assist with the history and to conduct the exam   I informed the patient that I have reviewed their record in Epic and presented the opportunity for them to ask any questions regarding the visit today  The patient agreed to participate  Assessment/Plan     Assessment /plan:  1  New onset diabetes- it is unclear if he has type 1 or type 2 diabetes at this time   population presents with DKA in the setting of type 2 diabetes at a higher frequency  Agree with checking antibodies to make sure if he has type 1 diabetes or not  Since his blood sugars have improved, transition to noncritical care IV insulin infusion  Add Humalog 10 units with each meals  Recommend checking urine for microalbumin  It would be reasonable to transition him to basal bolus insulin therapy with Lantus 30 units at bedtime and Humalog 10 units with meals  Discontinue  IV insulin 1 hour after Lantus is given  He will need education on insulin injection and glucometer use  Would recommend consultation to nutrition  2   DKA has improved      3   COVID-19 infection -management per primary team       History of Present Illness     HPI: Ruby Coral Vivian Lopez is a 37y o  year old male who presents with   COVID-19 infection and DKA  He does admit to polyuria, polydipsia and nocturia along with a 45 lb weight loss over the last three months  He states that he has changed his lifestyle where he is not eating out as much, using less alcohol and exercising more  He denies any prior history of diabetes  There is no history of diabetes in his immediate family  He denies any heart attack, stroke or claudication  He denies any symptoms of neuropathy  Inpatient consult to Endocrinology  Consult performed by: Yelitza Logan MD  Consult ordered by: Nasima Herrera PA-C          Review of Systems   Constitutional: Positive for unexpected weight change  Negative for chills and fever  Respiratory: Negative for shortness of breath  Cardiovascular: Negative for chest pain  Gastrointestinal: Negative for constipation, diarrhea, nausea and vomiting  Endocrine: Positive for polydipsia and polyuria  All other systems reviewed and are negative  Historical Information   History reviewed  No pertinent past medical history  History reviewed  No pertinent surgical history  Social History   Social History     Substance and Sexual Activity   Alcohol Use Yes    Alcohol/week: 12 0 standard drinks    Types: 12 Cans of beer per week    Comment: socially     Social History     Substance and Sexual Activity   Drug Use No     E-Cigarette/Vaping    E-Cigarette Use Never User      E-Cigarette/Vaping Substances    Nicotine No     THC No     CBD No     Flavoring No      Social History     Tobacco Use   Smoking Status Never Smoker   Smokeless Tobacco Never Used     Family History: non-contributory    Meds/Allergies   all current active meds have been reviewed  No Known Allergies    Objective   Vitals: Blood pressure 117/70, pulse 86, temperature 98 °F (36 7 °C), resp  rate 18, height 5' 7" (1 702 m), weight 84 6 kg (186 lb 8 2 oz), SpO2 94 %      Intake/Output Summary (Last 24 hours) at 7/21/2021 1125  Last data filed at 7/21/2021 1013  Gross per 24 hour   Intake 6050 95 ml   Output 2325 ml   Net 3725 95 ml     Invasive Devices     Peripheral Intravenous Line            Peripheral IV 07/20/21 Left Antecubital <1 day    Peripheral IV 07/20/21 Right Antecubital <1 day                Physical Exam  Vitals and nursing note reviewed  Constitutional:       Appearance: Normal appearance  HENT:      Head: Normocephalic and atraumatic  Nose: Nose normal    Eyes:      General: No scleral icterus  Right eye: No discharge  Left eye: No discharge  Pulmonary:      Effort: Pulmonary effort is normal    Musculoskeletal:      Cervical back: Normal range of motion  Skin:     Coloration: Skin is not jaundiced  Neurological:      Mental Status: He is alert and oriented to person, place, and time  Psychiatric:         Mood and Affect: Mood normal          Behavior: Behavior normal          Lab Results: I have personally reviewed pertinent reports  Hemoglobin A1c is 12 9%  Await antibody testing

## 2021-07-21 NOTE — ASSESSMENT & PLAN NOTE
· Likely pre renal in the setting of dehydration and DKA   · Improving with fluid resuscitation, 1 91 on admission  · Baseline creatinine unknown as patient has no recent lab work to review  · Continue to monitor renal indices and urine output

## 2021-07-21 NOTE — RESPIRATORY THERAPY NOTE
RT Protocol Note  Leatha Sanchez 37 y o  male MRN: [id number]  Unit/Bed#:  Encounter: [id number]    Assessment    Active Problems:    Diabetic ketoacidosis without coma ([facility] [address] )      Home Pulmonary Medications:  none       History reviewed  No pertinent past medical history  Social History     Socioeconomic History    Marital status: Single     Spouse name: None    Number of children: None    Years of education: None    Highest education level: None   Occupational History    None   Tobacco Use    Smoking status: Never Smoker    Smokeless tobacco: Never Used   Substance and Sexual Activity    Alcohol use: No     Comment: socially    Drug use: No    Sexual activity: None   Other Topics Concern    None   Social History Narrative    None     Social Determinants of Health     Financial Resource Strain:     Difficulty of Paying Living Expenses:    Food Insecurity:     Worried About Running Out of Food in the Last Year:     Ran Out of Food in the Last Year:    Transportation Needs:     Lack of Transportation (Medical):  Lack of Transportation (Non-Medical):    Physical Activity:     Days of Exercise per Week:     Minutes of Exercise per Session:    Stress:     Feeling of Stress :    Social Connections:     Frequency of Communication with Friends and Family:     Frequency of Social Gatherings with Friends and Family:     Attends L[employer] Services:     Active Member of Clubs or Organizations:     Attends Club or Organization Meetings:     Marital Status:    Intimate Partner Violence:     Fear of Current or Ex-Partner:     Emotionally Abused:     Physically Abused:     Sexually Abused:        Subjective  Pt offers no respiratory complaints at this time         Objective    Physical Exam:   Assessment Type: Assess only  General Appearance: Awake, Alert  Respiratory Pattern: Normal  Chest Assessment: Chest expansion symmetrical  Bilateral Breath Sounds: Clear  O2 Device: RA    Vitals:  Blood pressure 152/81, pulse (!) 108, temperature 98 5 °F (36 9 °C), temperature source Oral, resp  rate 18, height 5' 7" (1 702 m), weight 82 kg (180 lb 12 4 oz), SpO2 93 %  Imaging and other studies: I have personally reviewed pertinent reports        O2 Device: RA     Plan    Respiratory Plan: No distress/Pulmonary history, Discontinue Protocol

## 2021-07-21 NOTE — ED PROVIDER NOTES
History  Chief Complaint   Patient presents with    Shortness of Breath     Patient reports cough and SOB since Friday  Patient reports no other symptoms  Patient is a 59-year-old male  He is a nonsmoker  He is not COVID vaccinated  He presents to the emergency room complaining of shortness of breath since Friday  He does have some cough  He has been wheezing  He denies fever or chills  No hemoptysis  No calf pain or unilateral leg swelling  No chest pain  Symptoms are moderate in severity without aggravating or relieving factors  None       History reviewed  No pertinent past medical history  History reviewed  No pertinent surgical history  History reviewed  No pertinent family history  I have reviewed and agree with the history as documented  E-Cigarette/Vaping     E-Cigarette/Vaping Substances     Social History     Tobacco Use    Smoking status: Never Smoker    Smokeless tobacco: Never Used   Substance Use Topics    Alcohol use: No     Comment: socially    Drug use: No       Review of Systems   Constitutional: Negative for chills and fever  HENT: Negative for rhinorrhea and sore throat  Eyes: Negative for pain, redness and visual disturbance  Respiratory: Positive for cough, shortness of breath and wheezing  Cardiovascular: Negative for chest pain and leg swelling  Gastrointestinal: Negative for abdominal pain, diarrhea and vomiting  Endocrine: Negative for polydipsia and polyuria  Genitourinary: Negative for dysuria, frequency and hematuria  Musculoskeletal: Negative for back pain and neck pain  Skin: Negative for rash and wound  Allergic/Immunologic: Negative for immunocompromised state  Neurological: Negative for weakness, numbness and headaches  Psychiatric/Behavioral: Negative for hallucinations and suicidal ideas  All other systems reviewed and are negative  Physical Exam  Physical Exam  Vitals reviewed     Constitutional:       General: He is not in acute distress  Appearance: He is not toxic-appearing  HENT:      Head: Normocephalic and atraumatic  Nose: Nose normal       Mouth/Throat:      Mouth: Mucous membranes are moist    Eyes:      General:         Right eye: No discharge  Left eye: No discharge  Conjunctiva/sclera: Conjunctivae normal    Cardiovascular:      Rate and Rhythm: Regular rhythm  Tachycardia present  Pulses: Normal pulses  Heart sounds: Normal heart sounds  No murmur heard  No friction rub  No gallop  Pulmonary:      Effort: Pulmonary effort is normal  No respiratory distress  Breath sounds: No stridor  Decreased breath sounds and wheezing present  No rhonchi or rales  Abdominal:      General: Bowel sounds are normal  There is no distension  Palpations: Abdomen is soft  Tenderness: There is no abdominal tenderness  There is no right CVA tenderness, left CVA tenderness, guarding or rebound  Musculoskeletal:         General: No swelling, tenderness, deformity or signs of injury  Normal range of motion  Cervical back: Normal range of motion and neck supple  No rigidity  Right lower leg: No edema  Left lower leg: No edema  Comments: No calf pain or unilateral leg swelling   Skin:     General: Skin is warm and dry  Coloration: Skin is not jaundiced or pale  Findings: No bruising, erythema or rash  Neurological:      General: No focal deficit present  Mental Status: He is alert and oriented to person, place, and time  Cranial Nerves: No facial asymmetry  Sensory: No sensory deficit  Motor: Motor function is intact     Psychiatric:         Mood and Affect: Mood normal          Behavior: Behavior normal          Vital Signs  ED Triage Vitals [07/20/21 1918]   Temperature Pulse Respirations Blood Pressure SpO2   98 5 °F (36 9 °C) (!) 127 19 122/87 98 %      Temp Source Heart Rate Source Patient Position - Orthostatic VS BP Location FiO2 (%)   Oral Monitor Sitting Left arm --      Pain Score       --           Vitals:    07/20/21 1918 07/20/21 2200   BP: 122/87 152/81   Pulse: (!) 127 (!) 120   Patient Position - Orthostatic VS: Sitting Lying         Visual Acuity      ED Medications  Medications   sodium chloride 0 9 % bolus 1,000 mL (1,000 mL Intravenous New Bag 7/20/21 2148)   sodium chloride 0 9 % infusion (has no administration in time range)     Followed by   sodium chloride 0 9 % infusion (has no administration in time range)   remdesivir (Veklury) 200 mg in sodium chloride 0 9 % 250 mL IVPB (has no administration in time range)     Followed by   remdesivir Elissa Del Rio) 100 mg in sodium chloride 0 9 % 250 mL IVPB (has no administration in time range)   insulin regular (HumuLIN R,NovoLIN R) 1 Units/mL in sodium chloride 0 9 % 100 mL infusion (has no administration in time range)   senna-docusate sodium (SENOKOT S) 8 6-50 mg per tablet 2 tablet (has no administration in time range)   polyethylene glycol (MIRALAX) packet 17 g (has no administration in time range)   cholecalciferol (VITAMIN D3) tablet 2,000 Units (has no administration in time range)   ascorbic acid (VITAMIN C) tablet 1,000 mg (has no administration in time range)   zinc sulfate (ZINCATE) capsule 220 mg (has no administration in time range)     Followed by   multivitamin-minerals (CENTRUM ADULTS) tablet 1 tablet (has no administration in time range)   heparin (porcine) subcutaneous injection 5,000 Units (has no administration in time range)   dextrose 5 % and sodium chloride 0 9 % infusion (has no administration in time range)   albuterol inhalation solution 10 mg (10 mg Nebulization Given 7/20/21 2051)     And   ipratropium (ATROVENT) 0 02 % inhalation solution 1 mg (1 mg Nebulization Given 7/20/21 2051)     And   sodium chloride 0 9 % inhalation solution 3 mL (3 mL Nebulization Given 7/20/21 2051)   methylPREDNISolone sodium succinate (Solu-MEDROL) injection 125 mg (125 mg Intravenous Given 7/20/21 2030)   sodium chloride 0 9 % bolus 1,000 mL (1,000 mL Intravenous New Bag 7/20/21 2108)       Diagnostic Studies  Results Reviewed     Procedure Component Value Units Date/Time    Lactic acid [[id number]]     Lab Status: No result Specimen: Blood     TSH WITH REFLEX TO FREE T4 [[id number]]     Lab Status: No result Specimen: Blood     Hemoglobin A1c w/EAG Estimation [[id number]]     Lab Status: No result Specimen: Blood     Basic metabolic panel [[id number]]     Lab Status: No result Specimen: Blood     Magnesium [[id number]]     Lab Status: No result Specimen: Blood     Phosphorus [[id number]]     Lab Status: No result Specimen: Blood     LD,Blood [[id number]] Collected: 07/20/21 2142    Lab Status: In process Specimen: Blood from Arm, Left Updated: 07/20/21 2151    C-reactive protein [[id number]] Collected: 07/20/21 2142    Lab Status: In process Specimen: Blood from Arm, Left Updated: 07/20/21 2151    Fibrinogen [[id number]] Collected: 07/20/21 2142    Lab Status: In process Specimen: Blood from Arm, Left Updated: 07/20/21 2151    Ferritin [[id number]] Collected: 07/20/21 2142    Lab Status: In process Specimen: Blood from Arm, Left Updated: 07/20/21 2151    Procalcitonin [[id number]] Collected: 07/20/21 2142    Lab Status: In process Specimen: Blood from Arm, Left Updated: 07/20/21 2151    Interleukin-6,Serum [[id number]] Collected: 07/20/21 2142    Lab Status: In process Specimen: Blood from Arm, Left Updated: 07/20/21 2151    Blood culture #2 [[id number]] Collected: 07/20/21 2142    Lab Status: In process Specimen: Blood from Arm, Left Updated: 07/20/21 2151    Blood culture #1 [[id number]] Collected: 07/20/21 2142    Lab Status:  In process Specimen: Blood from Arm, Right Updated: 07/20/21 2151    Fingerstick Glucose (POCT) [[id number]]  (Abnormal) Collected: 07/20/21 2141    Lab Status: Final result Updated: 07/20/21 2144     POC Glucose 369 mg/dl     [facility]ov[facility] [[id number]]  (Abnormal) Collected: 07/20/21 2023    Lab Status: Final result Specimen: Nares from Nose Updated: 07/20/21 2118     SARS-CoV-2 Positive    Narrative: The specimen collection materials, transport medium, and/or testing methodology utilized in the production of these test results have been proven to be reliable in a limited validation with an abbreviated program under the Emergency Utilization Authorization provided by the FDA  Testing reported as "Presumptive positive" will be confirmed with secondary testing to ensure result accuracy  Clinical caution and judgement should be used with the interpretation of these results with consideration of the clinical impression and other laboratory testing  Testing reported as "Positive" or "Negative" has been proven to be accurate according to standard laboratory validation requirements  All testing is performed with control materials showing appropriate reactivity at standard intervals        Beta Hydroxybutyrate [[id number]]  (Abnormal) Collected: 07/20/21 2108    Lab Status: Final result Specimen: Blood from Arm, Right Updated: 07/20/21 2116     BETA-HYDROXYBUTYRATE 6 7 mmol/L     Blood gas, venous [[id number]]  (Abnormal) Collected: 07/20/21 2108    Lab Status: Final result Specimen: Blood from Arm, Right Updated: 07/20/21 2112     pH, Michael 7 161     pCO2, Michael 31 5 mm Hg      pO2, Michael 36 2 mm Hg      HCO3, Michael 11 0 mmol/L      Base Excess, Michael -16 4 mmol/L      O2 Content, Michael 14 3 ml/dL      O2 HGB, VENOUS 62 1 %     NT-BNP PRO [[id number]]  (Normal) Collected: 07/20/21 2023    Lab Status: Final result Specimen: Blood from Arm, Right Updated: 07/20/21 2053     NT-proBNP 10 pg/mL     Troponin I [[id number]]  (Normal) Collected: 07/20/21 2023    Lab Status: Final result Specimen: Blood from Arm, Right Updated: 07/20/21 2048     Troponin I <0 02 ng/mL     Comprehensive metabolic panel [[id number]]  (Abnormal) Collected: 07/20/21 2023    Lab Status: Final result Specimen: Blood from Arm, Right Updated: 07/20/21 2047     Sodium 130 mmol/L      Potassium 4 8 mmol/L      Chloride 91 mmol/L      CO2 14 mmol/L      ANION GAP 25 mmol/L      BUN 12 mg/dL      Creatinine 1 91 mg/dL      Glucose 390 mg/dL      Calcium 9 1 mg/dL      AST 37 U/L      ALT 43 U/L      Alkaline Phosphatase 73 U/L      Total Protein 9 2 g/dL      Albumin 3 6 g/dL      Total Bilirubin 0 63 mg/dL      eGFR 52 ml/min/1 73sq m     Narrative:      [place] guidelines for Chronic Kidney Disease (CKD):     Stage 1 with normal or high GFR (GFR > 90 mL/min/1 73 square meters)    Stage 2 Mild CKD (GFR = 60-89 mL/min/1 73 square meters)    Stage 3A Moderate CKD (GFR = 45-59 mL/min/1 73 square meters)    Stage 3B Moderate CKD (GFR = 30-44 mL/min/1 73 square meters)    Stage 4 Severe CKD (GFR = 15-29 mL/min/1 73 square meters)    Stage 5 End Stage CKD (GFR <15 mL/min/1 73 square meters)  Note: GFR calculation is accurate only with a steady state creatinine    D-Dimer [[id number]]  (Normal) Collected: 07/20/21 2023    Lab Status: Final result Specimen: Blood from Arm, Right Updated: 07/20/21 2043     D-Dimer, Quant 0 44 ug/ml FEU     CBC and differential [[id number]]  (Abnormal) Collected: 07/20/21 2023    Lab Status: Final result Specimen: Blood from Arm, Right Updated: 07/20/21 2030     WBC 8 08 Thousand/uL      RBC 5 39 Million/uL      Hemoglobin 16 1 g/dL      Hematocrit 47 8 %      MCV 89 fL      MCH 29 9 pg      MCHC 33 7 g/dL      RDW 12 3 %      MPV 10 9 fL      Platelets 198 Thousands/uL      nRBC 0 /100 WBCs      Neutrophils Relative 83 %      Immat GRANS % 1 %      Lymphocytes Relative 10 %      Monocytes Relative 6 %      Eosinophils Relative 0 %      Basophils Relative 0 %      Neutrophils Absolute 6 70 Thousands/µL      Immature Grans Absolute 0 05 Thousand/uL      Lymphocytes Absolute 0 80 Thousands/µL      Monocytes Absolute 0 52 Thousand/µL      Eosinophils Absolute 0 00 Thousand/µL      Basophils Absolute 0 01 Thousands/µL                  XR chest 1 view portable   ED Interpretation by Marina Nur MD (07/20 2105)   Poor inspiratory effort  No pneumothorax  No infiltrates  No CHF  Procedures  CriticalCare Time  Performed by: Marina Nur MD  Authorized by: Marina Nur MD     Critical care provider statement:     Critical care time (minutes):  60    Critical care time was exclusive of:  Separately billable procedures and treating other patients    Critical care was necessary to treat or prevent imminent or life-threatening deterioration of the following conditions:  Metabolic crisis    Critical care was time spent personally by me on the following activities:  Obtaining history from patient or surrogate, development of treatment plan with patient or surrogate, discussions with consultants, evaluation of patient's response to treatment, examination of patient, ordering and performing treatments and interventions, ordering and review of laboratory studies, ordering and review of radiographic studies and re-evaluation of patient's condition    I assumed direction of critical care for this patient from another provider in my specialty: no      ECG 12 Lead Documentation Only    Date/Time: 7/20/2021 10:07 PM  Performed by: Marina Nur MD  Authorized by: Marina Nur MD     ECG reviewed by me, the ED Provider: yes    Patient location:  ED  Interpretation:     Interpretation: normal    Rate:     ECG rate assessment: tachycardic    Rhythm:     Rhythm: sinus tachycardia    Ectopy:     Ectopy: none    QRS:     QRS axis:  Normal  Conduction:     Conduction: normal    ST segments:     ST segments:  Normal  T waves:     T waves: normal               ED Course                             SBIRT 22yo+      Most Recent Value   SBIRT (22 yo +)   In order to provide better care to our patients, we are screening all of our patients for alcohol and drug use  Would it be okay to ask you these screening questions? Yes Filed at: 07/20/2021 1958   Initial Alcohol Screen: US AUDIT-C    1  How often do you have a drink containing alcohol?  0 Filed at: 07/20/2021 1958   2  How many drinks containing alcohol do you have on a typical day you are drinking? 0 Filed at: 07/20/2021 1958   3a  Male UNDER 65: How often do you have five or more drinks on one occasion? 0 Filed at: 07/20/2021 1958   Audit-C Score  0 Filed at: 07/20/2021 1958   MARY JANE: How many times in the past year have you    Used an illegal drug or used a prescription medication for non-medical reasons? Never Filed at: 07/20/2021 1958                    MDM  Number of Diagnoses or Management Options  Diagnosis management comments: Chest x-ray was negative for pneumonia  Patient was COVID positive  No pneumothorax  EKG did not suggest acute coronary ischemia  There is no congestive heart failure  Patient was, however, found to be diabetic in DKA  IV fluids and insulin the or ordered  Remdesivir was ordered  Consulted with Critical Care for admission         Amount and/or Complexity of Data Reviewed  Clinical lab tests: ordered and reviewed  Tests in the radiology section of CPT®: ordered and reviewed  Discuss the patient with other providers: yes  Independent visualization of images, tracings, or specimens: yes        Disposition  Final diagnoses:   DKA (diabetic ketoacidoses) ([facility] [address] )   COVID-19     Time reflects when diagnosis was documented in both MDM as applicable and the Disposition within this note     Time User Action Codes Description Comment    7/20/2021 10:00 PM Tera Lomeli Add [E11 10] Diabetic ketoacidosis without coma ([facility] [address] )     7/20/2021 10:03 PM Allan Leach [E11 10] DKA (diabetic ketoacidoses) ([facility] [address] )     7/20/2021 10:04 PM Rebekah Bob Add [U07 1] COVID-19       ED Disposition     ED Disposition Condition Date/Time Comment    Admit Stable Tue Jul 20, 2021  9:59 PM         Follow-up Information    None         Patient's Medications    No medications on file     No discharge procedures on file      PDMP Review     None          ED Provider  Electronically Signed by           Balaji Garcia MD  07/20/21 [address], [place]  07/20/21 2809

## 2021-07-21 NOTE — UTILIZATION REVIEW
Initial Clinical Review    Admission: Date/Time/Statement:   Admission Orders (From admission, onward)     Ordered        07/20/21 2202  Inpatient Admission  Once                   Orders Placed This Encounter   Procedures    Inpatient Admission     Standing Status:   Standing     Number of Occurrences:   1     Order Specific Question:   Level of Care     Answer:   Critical Care [15]     Order Specific Question:   Estimated length of stay     Answer:   More than 2 Midnights     Order Specific Question:   Certification     Answer:   I certify that inpatient services are medically necessary for this patient for a duration of greater than two midnights  See H&P and MD Progress Notes for additional information about the patient's course of treatment  ED Arrival Information     Expected Arrival Acuity    - 7/20/2021 19:13 Emergent         Means of arrival Escorted by Service Admission type    Walk-In Family Member Critical Care/ICU Urgent         Arrival complaint    SOB        Chief Complaint   Patient presents with    Shortness of Breath     Patient reports cough and SOB since Friday  Patient reports no other symptoms  Initial Presentation: 36 yo male to ED from home w/ SOB and cough since last thurs   difficulty walking up 10 stairs   Lab workup revealed high anion gap metabolic acidosis, hyperglycemia, BHB 6 7, DAYA and COVID positive test  Wheezy on exam , resolved w/ neb   O2 sat 97 %   Given 2l fluid and insulin gtt started   Admitted IP status to  ICU starte don Vit C/D/Zinc, steroids, abx , remdesivir , sq heparin , keep O2 sat >92%   DKA cont insulin gtt , monitor labs q4hr   DAYA cont to monitor  Date: 7/21   Day 2: no acute events over night , remains on RA   Cont to monitor labs   Transition to reg insulin infusion when gap has closed  Breath sounds dec   , mm dry        ED Triage Vitals   Temperature Pulse Respirations Blood Pressure SpO2   07/20/21 1918 07/20/21 1918 07/20/21 1918 07/20/21 1918 07/20/21 1918   98 5 °F (36 9 °C) (!) 127 19 122/87 98 %      Temp Source Heart Rate Source Patient Position - Orthostatic VS BP Location FiO2 (%)   07/20/21 1918 07/20/21 1918 07/20/21 1918 07/20/21 1918 --   Oral Monitor Sitting Left arm       Pain Score       07/20/21 2250       No Pain          Wt Readings from Last 1 Encounters:   07/21/21 84 6 kg (186 lb 8 2 oz)     Additional Vital Signs:   07/21/21 1100  --  86  --  117/70  88  94 %  --  --  --   07/21/21 1000  --  87  --  112/67  84  94 %  --  --  --   07/21/21 0900  --  97  --  109/72  87  96 %  --  --  --   07/21/21 0800  --  92  --  116/68  86  96 %  --  --  --   07/21/21 0700  98 °F (36 7 °C)  85  --  112/66  85  95 %  --  --  --   07/21/21 0600  --  89  18  116/68  87  93 %  --  None (Room air)  Lying   07/21/21 0500  --  91  17  114/67  84  93 %  --  None (Room air)  --   07/21/21 0400  97 9 °F (36 6 °C)  94  19  123/75  93  94 %  --  None (Room air)  Lying   07/21/21 0300  --  92  18  122/70  91  95 %  --  None (Room air)  --   07/21/21 0200  --  96  17  121/68  89  95 %  --  None (Room air)  Lying   07/21/21 0100  --  103  18  127/78  97  96 %  --  None (Room air)  --   07/21/21 0000  --  100  19  128/75  97  96 %  --  None (Room air)  Lying   07/20/21 2300  98 1 °F (36 7 °C)  103  21  149/80  108  97 %  --  None (Room air)  Lying   07/20/21 2238  --  108Abnormal   18  --  --  93 %  --  --  --   07/20/21 2200  --  120Abnormal   26Abnormal   152/81  106  97 %  --  None (Room air)  Lying   07/20/21 2053  --  --  --  --  --  98 %  8 L/min  Aerosol mask  --   07/20/21 2051  --  --  --  --  --  98 %  --  --  --   07/20/21 2000  --  --  --  --  --  --  --  None (Room air)         Pertinent Labs/Diagnostic Test Results:   7/20 PCXR No acute cardiopulmonary disease    7/20 EKG   Rhythm: sinus tachycardia     Ectopy:     Ectopy: none     QRS:     QRS axis:  Normal   Conduction:     Conduction: normal     ST segments:     ST segments:  Normal   T waves:   T waves: normal    Results from last 7 days   Lab Units 07/20/21 2023   SARS-COV-2  Positive*     Results from last 7 days   Lab Units 07/20/21 2023   WBC Thousand/uL 8 08   HEMOGLOBIN g/dL 16 1   HEMATOCRIT % 47 8   PLATELETS Thousands/uL 202   NEUTROS ABS Thousands/µL 6 70     Results from last 7 days   Lab Units 07/21/21  0744 07/21/21  0000 07/20/21 2249 07/20/21 2023   SODIUM mmol/L 138  --  131* 130*   POTASSIUM mmol/L 3 6  --  5 0 4 8   CHLORIDE mmol/L 106  --  96* 91*   CO2 mmol/L 19*  --  10* 14*   ANION GAP mmol/L 13  --  25* 25*   BUN mg/dL 9  --  12 12   CREATININE mg/dL 1 26  --  1 69* 1 91*   EGFR ml/min/1 73sq m 86  --  60 52   CALCIUM mg/dL 7 3*  --  7 7* 9 1   CALCIUM, IONIZED mmol/L  --  1 08*  --   --    MAGNESIUM mg/dL 2 0  --  2 0  --    PHOSPHORUS mg/dL 0 9*  --  3 9  --      Results from last 7 days   Lab Units 07/20/21 2023   AST U/L 37   ALT U/L 43   ALK PHOS U/L 73   TOTAL PROTEIN g/dL 9 2*   ALBUMIN g/dL 3 6   TOTAL BILIRUBIN mg/dL 0 63     Results from last 7 days   Lab Units 07/21/21  1009 07/21/21  0857 07/21/21  0741 07/21/21  0558 07/21/21  0503 07/21/21  0423 07/21/21  0251 07/21/21  0149 07/21/21  0052 07/20/21  2358 07/20/21  2330 07/20/21 2234   POC GLUCOSE mg/dl 66 109 119 194* 207* 194* 217* 195* 259* 286* 297* 388*     Results from last 7 days   Lab Units 07/21/21  0744 07/20/21 2249 07/20/21 2023   GLUCOSE RANDOM mg/dL 145* 372* 390*     Results from last 7 days   Lab Units 07/20/21 2023   HEMOGLOBIN A1C % 12 9*   EAG mg/dl 324     BETA-HYDROXYBUTYRATE   Date Value Ref Range Status   07/20/2021 6 7 (H) <0 6 mmol/L Final          Results from last 7 days   Lab Units 07/21/21  1103 07/21/21  0744 07/21/21  0456 07/20/21  2108   PH MELVA  7 312 7 269* 7 240* 7 161*   PCO2 MELVA mm Hg 34 9* 37 7* 29 6* 31 5*   PO2 MELVA mm Hg 30 0* 38 6 80 5* 36 2   HCO3 MELVA mmol/L 17 3* 16 9* 12 4* 11 0*   BASE EXC MELVA mmol/L -8 1 -9 3 -13 5 -16 4   O2 CONTENT MELVA ml/dL 11 5 13 3  --  14 3 O2 HGB, VENOUS % 62 9 72 3  --  62 1     Results from last 7 days   Lab Units 07/20/21  2142   CK TOTAL U/L 436*   CK MB INDEX % <1 0   CK MB ng/mL <1 0     Results from last 7 days   Lab Units 07/20/21 2023   TROPONIN I ng/mL <0 02     Results from last 7 days   Lab Units 07/20/21 2023   D-DIMER QUANTITATIVE ug/ml FEU 0 44     Results from last 7 days   Lab Units 07/20/21  2142   TSH 3RD GENERATON uIU/mL 1 310     Results from last 7 days   Lab Units 07/21/21  0423 07/20/21  2142   PROCALCITONIN ng/ml 0 20 0 55*     Results from last 7 days   Lab Units 07/21/21  0744 07/21/21  0426 07/21/21  0054 07/20/21  2249   LACTIC ACID mmol/L 1 6 2 4* 2 3* 3 9*     Results from last 7 days   Lab Units 07/20/21 2023   NT-PRO BNP pg/mL 10     Results from last 7 days   Lab Units 07/20/21  2142   FERRITIN ng/mL 1,112*     Results from last 7 days   Lab Units 07/20/21  2142   CRP mg/L 187 4*     Results from last 7 days   Lab Units 07/20/21  2332   CLARITY UA  Clear   COLOR UA  Yellow   SPEC GRAV UA  >=1 030   PH UA  6 0   GLUCOSE UA mg/dl >=1000 (1%)*   KETONES UA mg/dl >=80 (3+)*   BLOOD UA  Trace-Intact*   PROTEIN UA mg/dl 30 (1+)*   NITRITE UA  Negative   BILIRUBIN UA  Negative   UROBILINOGEN UA E U /dl 0 2   LEUKOCYTES UA  Elevated glucose may cause decreased leukocyte values  See urine microscopic for [facility] result/*   WBC UA /hpf None Seen   RBC UA /hpf 1-2*   BACTERIA UA /hpf None Seen   EPITHELIAL CELLS WET PREP /hpf None Seen     Results from last 7 days   Lab Units 07/20/21 2142   BLOOD CULTURE  Received in Microbiology Lab  Culture in Progress  Received in Microbiology Lab  Culture in Progress         ED Treatment:   Medication Administration from 07/20/2021 1913 to 07/20/2021 2234       Date/Time Order Dose Route Action Action by Comments     07/20/2021 2051 albuterol inhalation solution 10 mg 10 mg Nebulization Given Soundra Grad, RT      07/20/2021 2051 ipratropium (ATROVENT) 0 02 % inhalation solution 1 mg 1 mg Nebulization Given Lakia Meng, RT      07/20/2021 2051 sodium chloride 0 9 % inhalation solution 3 mL 3 mL Nebulization Given Lakia Meng, RT      07/20/2021 2030 methylPREDNISolone sodium succinate (Solu-MEDROL) injection 125 mg 125 mg Intravenous Given Aliyah Ward RN      07/20/2021 2214 sodium chloride 0 9 % bolus 1,000 mL 0 mL Intravenous Stopped Aliyah Ward RN      07/20/2021 2108 sodium chloride 0 9 % bolus 1,000 mL 1,000 mL Intravenous New Bag Alyssa Martínez RN      07/20/2021 2148 sodium chloride 0 9 % bolus 1,000 mL 1,000 mL Intravenous New Bag Aliyah Ward RN      07/20/2021 2214 sodium chloride 0 9 % infusion 500 mL/hr Intravenous [address] Aliyah Ward RN      07/20/2021 2214 remdesivir (Veklury) 200 mg in sodium chloride 0 9 % 250 mL IVPB 200 mg Intravenous New Bag Aliyah Ward RN         History reviewed  No pertinent past medical history    Present on Admission:  **None**      Admitting Diagnosis: DKA (diabetic ketoacidoses) ([facility]) [E11 10]  SOB (shortness of breath) [R06 02]  Diabetic ketoacidosis without coma ([facility] [address] ) [E11 10]  COVID-19 [U07 1]  Age/Sex: 37 y o  male  Admission Orders:  Scheduled Medications:  ascorbic acid, 1,000 mg, Oral, Q12H DANIELA  cefTRIAXone, 1,000 mg, Intravenous, Q24H  cholecalciferol, 2,000 Units, Oral, Daily  doxycycline, 100 mg, Intravenous, Q12H  enoxaparin, 40 mg, Subcutaneous, Q24H DANIELA  zinc sulfate, 220 mg, Oral, Daily   Followed by  Jesús Stevenson ON 7/28/2021] multivitamin-minerals, 1 tablet, Oral, Daily  polyethylene glycol, 17 g, Oral, Daily  remdesivir, 100 mg, Intravenous, Q24H  senna-docusate sodium, 2 tablet, Oral, BID  sodium phosphate, 30 mmol, Intravenous, Once      Continuous IV Infusions:  insulin regular (HumuLIN R,NovoLIN R) infusion, 0 3-21 Units/hr, Intravenous, Titrated  multi-electrolyte, 150 mL/hr, Intravenous, Continuous      PRN Meds:     Fingerstick q2hr   Neuro checks   NPO   IP CONSULT TO CASE MANAGEMENT  IP CONSULT TO NUTRITION SERVICES  IP CONSULT TO ENDOCRINOLOGY    Network Utilization Review Department  ATTENTION: Please call with any questions or concerns to [phone] and carefully listen to the prompts so that you are directed to the right person  All voicemails are confidential   Marcelo Sykes all requests for admission clinical reviews, approved or denied determinations and any other requests to dedicated fax number below belonging to the campus where the patient is receiving treatment   List of dedicated fax numbers for the Facilities:  [address] DENIALS (Administrative/Medical Necessity) [phone]   [address] (Maternity/NICU/Pediatrics) [phone]   [address] [place] [address] [address] [address] [id number] [place] [address] Darleen Staci [address] [facility] [place] [address] [phone]

## 2021-07-21 NOTE — ASSESSMENT & PLAN NOTE
· Presented with SOB x5 days accompanied by dry cough  · Denies any recent sick contacts   · Has not been vaccinated   · D-dimer 0 44  · Inflammatory markers pending, continue to trend  · Procal pending  · CXR unrevealing  · COVID Mild Pathway   · Diagnosed 7/20, symptom onset 7/15  · O2: RA  · Vitamin C/D/Zinc started 7/20  · Steroids: n/a  · ABX: Doxy/Ceftriaxone D1  · Convalescent plasma: Out of window based on symptom onset   · Remdesivir: Started 7/20 in the setting of DM risk factor  · Actemra: n/a  · Anticoagulation: Heparin SQ  · Veletri: n/a  · Proned: Encourage self proning  · Provide supplemental oxygen to maintain goal SpO2 > 92%  · Encourage pulmonary hygiene with IS and OOB during the day

## 2021-07-21 NOTE — PROGRESS NOTES
[facility]  Progress Note - Torrey Ser 1985, 37 y o  male MRN: [id number]  Unit/Bed#:  Encounter: [id number]  Primary Care Provider: No primary care provider on file     Date and time admitted to hospital: 7/20/2021  7:54 PM    * COVID-19  Assessment & Plan  · Presented with SOB x5 days accompanied by dry cough  · Denies any recent sick contacts   · Has not been vaccinated   · D-dimer 0 44  · Inflammatory markers, continue to trend  · Procal pending  · CXR unrevealing  · COVID Mild Pathway   · Diagnosed 7/20, symptom onset 7/15  · O2: RA  · Vitamin C/D/Zinc started 7/20  · Steroids: n/a  · ABX: Doxy/Ceftriaxone D2  · Convalescent plasma: Out of window based on symptom onset   · Remdesivir: Started 7/20 in the setting of DM risk factor  · Actemra: n/a  · Anticoagulation: Heparin SQ  · Veletri: n/a  · Proned: Encourage self proning  · Provide supplemental oxygen to maintain goal SpO2 > 92%  · Encourage pulmonary hygiene with IS and OOB during the day     Diabetic ketoacidosis without coma [facility])  Assessment & Plan  No results found for: HGBA1C    Recent Labs     07/21/21  0052 07/21/21  0149 07/21/21  0251 07/21/21  0423   POCGLU 259* 195* 217* 194*       Blood Sugar Average: Last 72 hrs:  (P) 275 625     · Patient presented with complaints of SOB was found to be COVID positive  · Known family history of Type II diabetes   · Denies recent nausea, vomiting, diarrhea, loss of appetite  · Denies urinary symptoms, UA negative  · Blood cultures pending   · Hemoglobin A1C pending  · BHB 6 7  · Lactic 3 9 on admission, trend until clear  · Received 2L IV fluid in ER followed by DKA fluid protocol   · DKA insulin infusion, transition to regular insulin infusion when gap has closed  · Monitor labs Q4, VBG, BMP, Mag, Phos  · Replete electrolytes per nursing protocol   · Will benefit from endocrinology consultation/outpatient follow up as well as nutrition consult    DAYA (acute kidney injury) [facility])  Assessment & Plan  · Likely pre renal in the setting of dehydration and DKA   · Improving with fluid resuscitation, 1 91 on admission  · Baseline creatinine unknown as patient has no recent lab work to review  · Continue to monitor renal indices and urine output    Metabolic acidosis, increased anion gap  Assessment & Plan  · Likely in the setting of DKA   · Continue to monitor frequent labs  · See plan as above      ----------------------------------------------------------------------------------------  HPI/24hr events: No acute events overnight, patient remains on RA  Disposition: Continue Critical Care   Code Status: Level 1 - Full Code  ---------------------------------------------------------------------------------------  SUBJECTIVE  Offers no complaints     Review of Systems   All other systems reviewed and are negative  Review of systems was reviewed and negative unless stated above in HPI/24-hour events   ---------------------------------------------------------------------------------------  OBJECTIVE    Vitals   Vitals:    21 0200 21 0300 21 0400 21 0500   BP: 121/68 122/70 123/75 114/67   BP Location: Right arm  Right arm    Pulse: 96 92 94 91   Resp: 17 18 19 17   Temp:   97 9 °F (36 6 °C)    TempSrc:   Oral    SpO2: 95% 95% 94% 93%   Weight:       Height:         Temp (24hrs), Av 2 °F (36 8 °C), Min:97 9 °F (36 6 °C), Max:98 5 °F (36 9 °C)  Current: Temperature: 97 9 °F (36 6 °C)          Respiratory:  SpO2: SpO2: 93 %, SpO2 Activity: SpO2 Activity: At Rest, SpO2 Device: O2 Device: None (Room air)       Invasive/non-invasive ventilation settings   Respiratory    Lab Data (Last 4 hours)    None         O2/Vent Data (Last 4 hours)    None                Physical Exam  Vitals and nursing note reviewed  Constitutional:       Appearance: Normal appearance  HENT:      Mouth/Throat:      Mouth: Mucous membranes are dry  Pharynx: Oropharynx is clear  Eyes:      Pupils: Pupils are equal, round, and reactive to light  Cardiovascular:      Rate and Rhythm: Normal rate and regular rhythm  Pulses: Normal pulses  Heart sounds: Normal heart sounds  Pulmonary:      Effort: Pulmonary effort is normal       Breath sounds: Decreased breath sounds present  Abdominal:      General: Bowel sounds are normal       Palpations: Abdomen is soft  Musculoskeletal:         General: Normal range of motion  Cervical back: Normal range of motion  Skin:     General: Skin is warm and dry  Neurological:      General: No focal deficit present  Mental Status: He is alert and oriented to person, place, and time           Laboratory and Diagnostics:  Results from last 7 days   Lab Units 07/20/21 2023   WBC Thousand/uL 8 08   HEMOGLOBIN g/dL 16 1   HEMATOCRIT % 47 8   PLATELETS Thousands/uL 202   NEUTROS PCT % 83*   MONOS PCT % 6     Results from last 7 days   Lab Units 07/20/21  2249 07/20/21 2023   SODIUM mmol/L 131* 130*   POTASSIUM mmol/L 5 0 4 8   CHLORIDE mmol/L 96* 91*   CO2 mmol/L 10* 14*   ANION GAP mmol/L 25* 25*   BUN mg/dL 12 12   CREATININE mg/dL 1 69* 1 91*   CALCIUM mg/dL 7 7* 9 1   GLUCOSE RANDOM mg/dL 372* 390*   ALT U/L  --  43   AST U/L  --  37   ALK PHOS U/L  --  73   ALBUMIN g/dL  --  3 6   TOTAL BILIRUBIN mg/dL  --  0 63     Results from last 7 days   Lab Units 07/20/21  2249   MAGNESIUM mg/dL 2 0   PHOSPHORUS mg/dL 3 9           Results from last 7 days   Lab Units 07/20/21 2023   TROPONIN I ng/mL <0 02     Results from last 7 days   Lab Units 07/21/21  0426 07/21/21  0054 07/20/21  2249   LACTIC ACID mmol/L 2 4* 2 3* 3 9*     ABG:    VBG:  Results from last 7 days   Lab Units 07/20/21  2250   PH MELVA  7 052*   PCO2 MELVA mm Hg 29 7*   PO2 MELVA mm Hg 52 7*   HCO3 MELVA mmol/L 8 1*   BASE EXC MELVA mmol/L -21 2     Results from last 7 days   Lab Units 07/20/21  2142   PROCALCITONIN ng/ml 0 55*       Micro  Results from last 7 days   Lab Units 07/20/21 2142   BLOOD CULTURE  Received in Microbiology Lab  Culture in Progress  Received in Microbiology Lab  Culture in Progress  EKG: normal sinus rhythm  Imaging: I have personally reviewed pertinent reports  and I have personally reviewed pertinent films in PACS    Intake and Output  I/O       07/19 0701 - 07/20 0700 07/20 0701 - 07/21 0700    P  O   500    I V  (mL/kg)  2541 (30)    IV Piggyback  2450    Total Intake(mL/kg)  5491 (64 9)    Urine (mL/kg/hr)  1850    Total Output  1850    Net  +3641                Height and Weights   Height: 5' 7" (170 2 cm)  IBW (Ideal Body Weight): 66 1 kg  Body mass index is 29 21 kg/m²  Weight (last 2 days)     Date/Time   Weight    07/20/21 2238   84 6 (186 51)    07/20/21 2148   82 (180 78)                Nutrition       Diet Orders   (From admission, onward)             Start     Ordered    07/20/21 2144  Diet NPO  Diet effective now     Question Answer Comment   Diet Type NPO    RD to adjust diet per protocol?  No        07/20/21 2202                  Active Medications  Scheduled Meds:  Current Facility-Administered Medications   Medication Dose Route Frequency Provider Last Rate    ascorbic acid  1,000 mg Oral Q12H [facility] MEGHAN Mustafa      cefTRIAXone  1,000 mg Intravenous Q24H MEGHAN Mustafa 1,000 mg (07/21/21 0055)    cholecalciferol  2,000 Units Oral Daily MEGHAN Mustafa      dextrose 5 % and sodium chloride 0 9 %  250 mL/hr Intravenous Continuous MEGHAN Mustafa 250 mL/hr (07/21/21 0200)    doxycycline  100 mg Intravenous Q12H MEGHAN Mustafa 100 mg (07/21/21 0001)    heparin (porcine)  5,000 Units Subcutaneous [facility] MEGHAN Mustafa      insulin regular (HumuLIN R,NovoLIN R) infusion  0 1-30 Units/hr Intravenous Continuous Ashlie Hudson MD 30 Units/hr (07/21/21 0300)    zinc sulfate  220 mg Oral Daily MEGHAN Mustafa      Followed by   Mortimer Deep ON 7/28/2021] multivitamin-minerals  1 tablet Oral Daily Malik Cerda MEGHAN      polyethylene glycol  17 g Oral Daily MEGHAN Louis      remdesivir  100 mg Intravenous Q24H Soham Armenta MD      senna-docusate sodium  2 tablet Oral BID MEGHAN Louis      sodium chloride  250 mL/hr Intravenous Continuous Soham Armenta MD       Continuous Infusions:  dextrose 5 % and sodium chloride 0 9 %, 250 mL/hr, Last Rate: 250 mL/hr (07/21/21 0200)  insulin regular (HumuLIN R,NovoLIN R) infusion, 0 1-30 Units/hr, Last Rate: 30 Units/hr (07/21/21 0300)  sodium chloride, 250 mL/hr      PRN Meds:        Invasive Devices Review  Invasive Devices     Peripheral Intravenous Line            Peripheral IV 07/20/21 Left Antecubital <1 day    Peripheral IV 07/20/21 Right Antecubital <1 day                Rationale for remaining devices: n/a  ---------------------------------------------------------------------------------------  Advance Directive and Living Will:      Power of :    POLST:    ---------------------------------------------------------------------------------------  Care Time Delivered:   Upon my evaluation, this patient had a high probability of imminent or life-threatening deterioration due to DKA, which required my direct attention, intervention, and personal management  I have personally provided 35 minutes ([phone]) of critical care time, exclusive of procedures, teaching, family meetings, and any prior time recorded by providers other than myself  MEGHAN Louis      Portions of the record may have been created with voice recognition software  Occasional wrong word or "sound a like" substitutions may have occurred due to the inherent limitations of voice recognition software    Read the chart carefully and recognize, using context, where substitutions have occurred

## 2021-07-21 NOTE — ASSESSMENT & PLAN NOTE
No results found for: HGBA1C    Recent Labs     07/21/21  0052 07/21/21  0149 07/21/21  0251 07/21/21  0423   POCGLU 259* 195* 217* 194*       Blood Sugar Average: Last 72 hrs:  (P) 275 625     · Patient presented with complaints of SOB was found to be COVID positive  · Known family history of Type II diabetes   · Denies recent nausea, vomiting, diarrhea, loss of appetite  · Denies urinary symptoms, UA negative  · Blood cultures pending   · Hemoglobin A1C pending  · BHB 6 7  · Lactic 3 9 on admission, trend until clear  · Received 2L IV fluid in ER followed by DKA fluid protocol   · DKA insulin infusion, transition to regular insulin infusion when gap has closed  · Monitor labs Q4, VBG, BMP, Mag, Phos  · Replete electrolytes per nursing protocol   · Will benefit from endocrinology consultation/outpatient follow up as well as nutrition consult

## 2021-07-21 NOTE — H&P
[address]ell [address] 1985, 37 y o  male MRN: [id number]  Unit/Bed#:  Encounter: [id number]  Primary Care Provider: No primary care provider on file     Date and time admitted to hospital: 7/20/2021  7:54 PM    * COVID-19  Assessment & Plan  · Presented with SOB x5 days accompanied by dry cough  · Denies any recent sick contacts   · Has not been vaccinated   · D-dimer 0 44  · Inflammatory markers pending, continue to trend  · Procal pending  · CXR unrevealing  · COVID Mild Pathway   · Diagnosed 7/20, symptom onset 7/15  · O2: RA  · Vitamin C/D/Zinc started 7/20  · Steroids: n/a  · ABX: Doxy/Ceftriaxone D1  · Convalescent plasma: Out of window based on symptom onset   · Remdesivir: Started 7/20 in the setting of DM risk factor  · Actemra: n/a  · Anticoagulation: Heparin SQ  · Veletri: n/a  · Proned: Encourage self proning  · Provide supplemental oxygen to maintain goal SpO2 > 92%  · Encourage pulmonary hygiene with IS and OOB during the day     Diabetic ketoacidosis without coma ([facility] )  Assessment & Plan  No results found for: HGBA1C    Recent Labs     07/20/21  2141   POCGLU 369*       Blood Sugar Average: Last 72 hrs:  (P) 369     · Patient presented with complaints of SOB was found to be COVID positive  · Known family history of Type II diabetes   · Denies recent nausea, vomiting, diarrhea, loss of appetite  · Denies urinary symptoms, UA pending  · Blood cultures pending   · Hemoglobin A1C pending  · BHB 6 7  · Received 2L IV fluid in ER followed by DKA fluid protocol   · DKA insulin infusion  · Monitor labs Q4  · Replete electrolytes per nursing protocol   · Will benefit from endocrinology consultation/outpatient follow up as well as nutrition consult    DAYA (acute kidney injury) ([facility] )  Assessment & Plan  · Likely in the setting of DKA   · IV fluid resuscitation  · Continue to monitor renal indices and urine output   · Baseline creatinine unknown as patient has no prior lab work to review     Metabolic acidosis, increased anion gap  Assessment & Plan  · Likely in the setting of DKA   · Continue to monitor frequent labs  · See plan as above    -------------------------------------------------------------------------------------------------------------  Chief Complaint: SOB/cough    History of Present Illness   HX and PE limited by: Gabrielle Marcos is a 37 y o  male with no significant past medical history who presents with SOB and cough since last Thursday or Friday, states he has difficulty walking up 10 steps  Lab workup revealed high anion gap metabolic acidosis, hyperglycemia, BHB 6 7, DAYA and COVID positive test  Was found to be wheezy on physical exam, resolved with neb treatment  CXR unrevealing and remains on room air with oxygen saturation 97%  Patient denies any recent nausea, vomiting, diarrhea  Does have known family history of diabetes  Given 2L IV fluid followed by DKA fluid protocol and initiation of DKA insulin infusion  Admitted to the ICU under mild COVID pathway  History obtained from the patient   -------------------------------------------------------------------------------------------------------------  Dispo: Admit to Critical Care     Code Status: Level 1 - Full Code  --------------------------------------------------------------------------------------------------------------  Review of Systems   Constitutional: Negative for appetite change, chills and fever  Respiratory: Positive for cough and shortness of breath  Cardiovascular: Negative for chest pain and leg swelling  Gastrointestinal: Negative for abdominal pain, diarrhea, nausea and vomiting  Genitourinary: Negative for difficulty urinating, frequency and urgency  Neurological: Negative for dizziness and light-headedness  All other systems reviewed and are negative        A 12-point, complete review of systems was reviewed and negative except as stated above     Physical Exam  Vitals and nursing note reviewed  Constitutional:       Appearance: Normal appearance  HENT:      Head: Normocephalic  Mouth/Throat:      Mouth: Mucous membranes are dry  Pharynx: Oropharynx is clear  Eyes:      Pupils: Pupils are equal, round, and reactive to light  Cardiovascular:      Rate and Rhythm: Regular rhythm  Tachycardia present  Pulses: Normal pulses  Heart sounds: Normal heart sounds  Pulmonary:      Effort: Pulmonary effort is normal       Breath sounds: Decreased breath sounds present  Abdominal:      General: Bowel sounds are normal       Palpations: Abdomen is soft  Musculoskeletal:         General: Normal range of motion  Cervical back: Normal range of motion  Skin:     General: Skin is warm and dry  Neurological:      General: No focal deficit present  Mental Status: He is alert and oriented to person, place, and time  --------------------------------------------------------------------------------------------------------------  Vitals:   Vitals:    07/20/21 2148 07/20/21 2200 07/20/21 2238 07/20/21 2300   BP:  152/81  149/80   BP Location:  Left arm  Right arm   Pulse:  (!) 120 (!) 108 103   Resp:  (!) 26 18 21   Temp:    98 1 °F (36 7 °C)   TempSrc:    Oral   SpO2:  97% 93% 97%   Weight: 82 kg (180 lb 12 4 oz)  84 6 kg (186 lb 8 2 oz)    Height: 5' 7" (1 702 m)  5' 7" (1 702 m)      Temp  Min: 98 1 °F (36 7 °C)  Max: 98 5 °F (36 9 °C)  IBW (Ideal Body Weight): 66 1 kg  Height: 5' 7" (170 2 cm)  Body mass index is 29 21 kg/m²      Laboratory and Diagnostics:  Results from last 7 days   Lab Units 07/20/21 2023   WBC Thousand/uL 8 08   HEMOGLOBIN g/dL 16 1   HEMATOCRIT % 47 8   PLATELETS Thousands/uL 202   NEUTROS PCT % 83*   MONOS PCT % 6     Results from last 7 days   Lab Units 07/20/21 2023   SODIUM mmol/L 130*   POTASSIUM mmol/L 4 8   CHLORIDE mmol/L 91*   CO2 mmol/L 14*   ANION GAP mmol/L 25*   BUN mg/dL 12   CREATININE mg/dL 1 91* CALCIUM mg/dL 9 1   GLUCOSE RANDOM mg/dL 390*   ALT U/L 43   AST U/L 37   ALK PHOS U/L 73   ALBUMIN g/dL 3 6   TOTAL BILIRUBIN mg/dL 0 63               Results from last 7 days   Lab Units 07/20/21 2023   TROPONIN I ng/mL <0 02         ABG:    VBG:  Results from last 7 days   Lab Units 07/20/21 2108   PH MELVA  7 161*   PCO2 MELVA mm Hg 31 5*   PO2 MELVA mm Hg 36 2   HCO3 MELVA mmol/L 11 0*   BASE EXC MELVA mmol/L -16 4           Micro:        EKG: sinus tachycardia   Imaging: I have personally reviewed pertinent reports  and I have personally reviewed pertinent films in PACS      Historical Information   History reviewed  No pertinent past medical history  History reviewed  No pertinent surgical history  Social History   Social History     Substance and Sexual Activity   Alcohol Use No    Comment: socially     Social History     Substance and Sexual Activity   Drug Use No     Social History     Tobacco Use   Smoking Status Never Smoker   Smokeless Tobacco Never Used     Exercise History: non contributory  Family History:   History reviewed  No pertinent family history    I have reviewed this patient's family history and commented on sigificant items within the HPI      Medications:  Current Facility-Administered Medications   Medication Dose Route Frequency    ascorbic acid (VITAMIN C) tablet 1,000 mg  1,000 mg Oral Q12H [address]    cefTRIAXone (ROCEPHIN) 1,000 mg in dextrose 5 % 50 mL IVPB  1,000 mg Intravenous Q24H    [START ON 7/21/2021] cholecalciferol (VITAMIN D3) tablet 2,000 Units  2,000 Units Oral Daily    dextrose 5 % and sodium chloride 0 9 % infusion  250 mL/hr Intravenous Continuous    doxycycline (VIBRAMYCIN) 100 mg in sodium chloride 0 9 % 100 mL IVPB  100 mg Intravenous Q12H    heparin (porcine) subcutaneous injection 5,000 Units  5,000 Units Subcutaneous Q8H [address]    insulin regular (HumuLIN R,NovoLIN R) 1 Units/mL in sodium chloride 0 9 % 100 mL infusion  0 1-30 Units/hr Intravenous Continuous    [START ON 7/21/2021] zinc sulfate (ZINCATE) capsule 220 mg  220 mg Oral Daily    Followed by   Audelia Will ON 7/28/2021] multivitamin-minerals (CENTRUM ADULTS) tablet 1 tablet  1 tablet Oral Daily    [START ON 7/21/2021] polyethylene glycol (MIRALAX) packet 17 g  17 g Oral Daily    [START ON 7/21/2021] remdesivir (Veklury) 100 mg in sodium chloride 0 9 % 250 mL IVPB  100 mg Intravenous Q24H    senna-docusate sodium (SENOKOT S) 8 6-50 mg per tablet 2 tablet  2 tablet Oral BID    sodium chloride 0 9 % infusion  500 mL/hr Intravenous Continuous    Followed by   Audelia Will ON 7/21/2021] sodium chloride 0 9 % infusion  250 mL/hr Intravenous Continuous     Home medications:  None     Allergies:  No Known Allergies    ------------------------------------------------------------------------------------------------------------  Advance Directive and Living Will:      Power of :    POLST:    ------------------------------------------------------------------------------------------------------------  Anticipated Length of Stay is > 2 midnights    Care Time Delivered:   Upon my evaluation, this patient had a high probability of imminent or life-threatening deterioration due to DKA, which required my direct attention, intervention, and personal management  I have personally provided 35 minutes (2225 to 2300) of critical care time, exclusive of procedures, teaching, family meetings, and any prior time recorded by providers other than myself  MEGHAN Cross        Portions of the record may have been created with voice recognition software  Occasional wrong word or "sound a like" substitutions may have occurred due to the inherent limitations of voice recognition software    Read the chart carefully and recognize, using context, where substitutions have occurred

## 2021-07-21 NOTE — ASSESSMENT & PLAN NOTE
· Likely in the setting of DKA   · IV fluid resuscitation  · Continue to monitor renal indices and urine output   · Baseline creatinine unknown as patient has no prior lab work to review

## 2021-07-21 NOTE — ASSESSMENT & PLAN NOTE
No results found for: HGBA1C    Recent Labs     07/20/21  2141   POCGLU 369*       Blood Sugar Average: Last 72 hrs:  (P) 369     · Patient presented with complaints of SOB was found to be COVID positive  · Known family history of Type II diabetes   · Denies recent nausea, vomiting, diarrhea, loss of appetite  · Denies urinary symptoms, UA pending  · Blood cultures pending   · Hemoglobin A1C pending  · BHB 6 7  · Received 2L IV fluid in ER followed by DKA fluid protocol   · DKA insulin infusion  · Monitor labs Q4  · Replete electrolytes per nursing protocol   · Will benefit from endocrinology consultation/outpatient follow up as well as nutrition consult

## 2021-07-22 LAB
ANION GAP SERPL CALCULATED.3IONS-SCNC: 12 MMOL/L (ref 4–13)
BASOPHILS # BLD AUTO: 0.01 THOUSANDS/ΜL (ref 0–0.1)
BASOPHILS NFR BLD AUTO: 0 % (ref 0–1)
BUN SERPL-MCNC: 8 MG/DL (ref 5–25)
CALCIUM SERPL-MCNC: 7.9 MG/DL (ref 8.3–10.1)
CHLORIDE SERPL-SCNC: 104 MMOL/L (ref 100–108)
CO2 SERPL-SCNC: 20 MMOL/L (ref 21–32)
CREAT SERPL-MCNC: 0.88 MG/DL (ref 0.6–1.3)
EOSINOPHIL # BLD AUTO: 0.01 THOUSAND/ΜL (ref 0–0.61)
EOSINOPHIL NFR BLD AUTO: 0 % (ref 0–6)
ERYTHROCYTE [DISTWIDTH] IN BLOOD BY AUTOMATED COUNT: 12.7 % (ref 11.6–15.1)
GFR SERPL CREATININE-BSD FRML MDRD: 130 ML/MIN/1.73SQ M
GLUCOSE SERPL-MCNC: 144 MG/DL (ref 65–140)
GLUCOSE SERPL-MCNC: 193 MG/DL (ref 65–140)
GLUCOSE SERPL-MCNC: 216 MG/DL (ref 65–140)
GLUCOSE SERPL-MCNC: 248 MG/DL (ref 65–140)
GLUCOSE SERPL-MCNC: 262 MG/DL (ref 65–140)
GLUCOSE SERPL-MCNC: 263 MG/DL (ref 65–140)
HCT VFR BLD AUTO: 37.9 % (ref 36.5–49.3)
HGB BLD-MCNC: 13.1 G/DL (ref 12–17)
IMM GRANULOCYTES # BLD AUTO: 0.14 THOUSAND/UL (ref 0–0.2)
IMM GRANULOCYTES NFR BLD AUTO: 1 % (ref 0–2)
LYMPHOCYTES # BLD AUTO: 1.08 THOUSANDS/ΜL (ref 0.6–4.47)
LYMPHOCYTES NFR BLD AUTO: 10 % (ref 14–44)
MAGNESIUM SERPL-MCNC: 2.2 MG/DL (ref 1.6–2.6)
MCH RBC QN AUTO: 29.5 PG (ref 26.8–34.3)
MCHC RBC AUTO-ENTMCNC: 34.6 G/DL (ref 31.4–37.4)
MCV RBC AUTO: 85 FL (ref 82–98)
MONOCYTES # BLD AUTO: 0.71 THOUSAND/ΜL (ref 0.17–1.22)
MONOCYTES NFR BLD AUTO: 7 % (ref 4–12)
NEUTROPHILS # BLD AUTO: 8.97 THOUSANDS/ΜL (ref 1.85–7.62)
NEUTS SEG NFR BLD AUTO: 82 % (ref 43–75)
NRBC BLD AUTO-RTO: 0 /100 WBCS
PHOSPHATE SERPL-MCNC: 2 MG/DL (ref 2.7–4.5)
PLATELET # BLD AUTO: 223 THOUSANDS/UL (ref 149–390)
PMV BLD AUTO: 10.5 FL (ref 8.9–12.7)
POTASSIUM SERPL-SCNC: 3.8 MMOL/L (ref 3.5–5.3)
PROCALCITONIN SERPL-MCNC: 0.18 NG/ML
RBC # BLD AUTO: 4.44 MILLION/UL (ref 3.88–5.62)
SODIUM SERPL-SCNC: 136 MMOL/L (ref 136–145)
WBC # BLD AUTO: 10.92 THOUSAND/UL (ref 4.31–10.16)

## 2021-07-22 PROCEDURE — 83735 ASSAY OF MAGNESIUM: CPT | Performed by: PHYSICIAN ASSISTANT

## 2021-07-22 PROCEDURE — 82948 REAGENT STRIP/BLOOD GLUCOSE: CPT

## 2021-07-22 PROCEDURE — 80048 BASIC METABOLIC PNL TOTAL CA: CPT | Performed by: PHYSICIAN ASSISTANT

## 2021-07-22 PROCEDURE — 85025 COMPLETE CBC W/AUTO DIFF WBC: CPT | Performed by: PHYSICIAN ASSISTANT

## 2021-07-22 PROCEDURE — 84100 ASSAY OF PHOSPHORUS: CPT | Performed by: PHYSICIAN ASSISTANT

## 2021-07-22 PROCEDURE — 99232 SBSQ HOSP IP/OBS MODERATE 35: CPT | Performed by: FAMILY MEDICINE

## 2021-07-22 PROCEDURE — 84681 ASSAY OF C-PEPTIDE: CPT | Performed by: PHYSICIAN ASSISTANT

## 2021-07-22 PROCEDURE — 86341 ISLET CELL ANTIBODY: CPT | Performed by: PHYSICIAN ASSISTANT

## 2021-07-22 PROCEDURE — 84145 PROCALCITONIN (PCT): CPT | Performed by: PHYSICIAN ASSISTANT

## 2021-07-22 RX ORDER — INSULIN LISPRO 100 [IU]/ML
10 INJECTION, SOLUTION INTRAVENOUS; SUBCUTANEOUS
Qty: 9 ML | Refills: 0 | Status: SHIPPED | OUTPATIENT
Start: 2021-07-22 | End: 2021-07-23 | Stop reason: HOSPADM

## 2021-07-22 RX ORDER — INSULIN GLARGINE 100 [IU]/ML
30 INJECTION, SOLUTION SUBCUTANEOUS
Qty: 15 ML | Refills: 0 | Status: SHIPPED | OUTPATIENT
Start: 2021-07-22 | End: 2021-10-21 | Stop reason: SDUPTHER

## 2021-07-22 RX ADMIN — INSULIN GLARGINE 30 UNITS: 100 INJECTION, SOLUTION SUBCUTANEOUS at 21:26

## 2021-07-22 RX ADMIN — INSULIN LISPRO 10 UNITS: 100 INJECTION, SOLUTION INTRAVENOUS; SUBCUTANEOUS at 09:33

## 2021-07-22 RX ADMIN — Medication 2000 UNITS: at 09:11

## 2021-07-22 RX ADMIN — INSULIN LISPRO 2 UNITS: 100 INJECTION, SOLUTION INTRAVENOUS; SUBCUTANEOUS at 12:28

## 2021-07-22 RX ADMIN — ENOXAPARIN SODIUM 40 MG: 40 INJECTION SUBCUTANEOUS at 09:11

## 2021-07-22 RX ADMIN — REMDESIVIR 100 MG: 100 INJECTION, POWDER, LYOPHILIZED, FOR SOLUTION INTRAVENOUS at 21:32

## 2021-07-22 RX ADMIN — CEFTRIAXONE SODIUM 1000 MG: 10 INJECTION, POWDER, FOR SOLUTION INTRAVENOUS at 22:21

## 2021-07-22 RX ADMIN — OXYCODONE HYDROCHLORIDE AND ACETAMINOPHEN 1000 MG: 500 TABLET ORAL at 21:26

## 2021-07-22 RX ADMIN — INSULIN LISPRO 2 UNITS: 100 INJECTION, SOLUTION INTRAVENOUS; SUBCUTANEOUS at 09:33

## 2021-07-22 RX ADMIN — DOCUSATE SODIUM AND SENNOSIDES 2 TABLET: 8.6; 5 TABLET, FILM COATED ORAL at 09:11

## 2021-07-22 RX ADMIN — INSULIN LISPRO 10 UNITS: 100 INJECTION, SOLUTION INTRAVENOUS; SUBCUTANEOUS at 17:40

## 2021-07-22 RX ADMIN — INSULIN LISPRO 2 UNITS: 100 INJECTION, SOLUTION INTRAVENOUS; SUBCUTANEOUS at 17:40

## 2021-07-22 RX ADMIN — DOXYCYCLINE 100 MG: 100 INJECTION, POWDER, LYOPHILIZED, FOR SOLUTION INTRAVENOUS at 22:21

## 2021-07-22 RX ADMIN — INSULIN LISPRO 10 UNITS: 100 INJECTION, SOLUTION INTRAVENOUS; SUBCUTANEOUS at 12:28

## 2021-07-22 RX ADMIN — OXYCODONE HYDROCHLORIDE AND ACETAMINOPHEN 1000 MG: 500 TABLET ORAL at 09:11

## 2021-07-22 RX ADMIN — DOXYCYCLINE 100 MG: 100 INJECTION, POWDER, LYOPHILIZED, FOR SOLUTION INTRAVENOUS at 12:29

## 2021-07-22 RX ADMIN — ZINC SULFATE 220 MG (50 MG) CAPSULE 220 MG: CAPSULE at 09:11

## 2021-07-22 NOTE — ASSESSMENT & PLAN NOTE
Patient is currently getting remdesivir  Will continue the remdesivir for now  Patient is not on any oxygen  Try to avoid steroids secondary to the hyperglycemia  Continue with respiratory protocol  Incentive spirometry  Patient is on antibiotics  If procalcitonin level is negative tomorrow we can likely discontinue this  D-dimer is stable as well    Will check a CRP tomorrow

## 2021-07-22 NOTE — CASE MANAGEMENT
CM spoke to pt via phone-he is Covid +  Pt lives with his father Tarik Carrion in a 2 story house with 13 steps w/railing to reach his bedroom  Pt is able to navigate steps and is independent with ADL's  He uses no DME's  No STR or VNA hx   Denies substance abuse, tobacco abuse, or mental health issues  He does not have a PCP  Pt gives permission to find him a local [address]  An appointment was set up with Valentin Zhang for Monday, 8/2/21 at 10am   Pt uses [facility] and has no problem with co-pays  He does not have a POA or Advanced Directive and does not want info at this time  His HCR/next of kin is his father  He works and drives  His father will transport home when he is medically cleared  CM discussed d/c needs including VNA for dm mtgm, but pt refuses  States that his father is a nurse and can assist if there are any new diabetic issues  CM tried to price Lantus and Humalog, but [facility] needs the rxs sent to them first   Dr Skinner Done will E-rx and CM will check cost   CM will continue to follow  CM reviewed discharge planning process including the following: identifying help at home, patient preference for discharge planning needs, pharmacy preference, and availability of treatment team to discuss questions or concerns patient and/or family may have regarding understanding medications and recognizing signs and symptoms once discharged  CM also encouraged patient to follow up with all recommended appointments after discharge  Patient advised of importance for patient and family to participate in managing patients medical well being

## 2021-07-22 NOTE — PROGRESS NOTES
[facility]  Progress Note - Charisse Sutton 1985, 37 y o  male MRN: [id number]  Unit/Bed#:  Encounter: [id number]  Primary Care Provider: No primary care provider on file  Date and time admitted to hospital: 7/20/2021  7:54 PM    DAYA (acute kidney injury) [facility])  Assessment & Plan   resolved with IV fluids    Metabolic acidosis, increased anion gap  Assessment & Plan  Secondary to DKA    Diabetic ketoacidosis without coma [facility])  Assessment & Plan  Lab Results   Component Value Date    HGBA1C 12 9 (H) 07/20/2021       Recent Labs     07/21/21  2338 07/22/21  0201 07/22/21  0841 07/22/21  1118   POCGLU 125 193* 248* 263*       Blood Sugar Average: Last 72 hrs:  (P) 074 [id number]   Endocrinology evaluation was appreciated  The patient is on Lantus and also on mealtime coverage  Prescriptions were given to case management  Diabetic education pamphlet was given  Patient can hopefully go home tomorrow  Blood sugars although not ideal are stable  * COVID-19  Assessment & Plan  Patient is currently getting remdesivir  Will continue the remdesivir for now  Patient is not on any oxygen  Try to avoid steroids secondary to the hyperglycemia  Continue with respiratory protocol  Incentive spirometry  Patient is on antibiotics  If procalcitonin level is negative tomorrow we can likely discontinue this  D-dimer is stable as well  Will check a CRP tomorrow      VTE Pharmacologic Prophylaxis:   Pharmacologic: Enoxaparin (Lovenox)  Mechanical VTE Prophylaxis in Place: Yes    Patient Centered Rounds: I have performed bedside rounds with nursing staff today  Time Spent for Care: 20 minutes  More than 50% of total time spent on counseling and coordination of care as described above      Current Length of Stay: 2 day(s)    Current Patient Status: Inpatient   Certification Statement: The patient will continue to require additional inpatient hospital stay due to Needing monitoring of vital signs and also getting IV remdesivir and needing better control of blood sugars in a new start diabetic    Discharge Plan:  Hopeful discharge in the next 24 hours    Code Status: Level 1 - Full Code      Subjective:   Patient seen examined  States he is feeling little better  States he really has not walked around to see whether he is short of breath or not since he is confined in the room    Objective:     Vitals:   Temp (24hrs), Av 9 °F (36 6 °C), Min:97 6 °F (36 4 °C), Max:98 1 °F (36 7 °C)    Temp:  [97 6 °F (36 4 °C)-98 1 °F (36 7 °C)] 97 7 °F (36 5 °C)  HR:  [79-97] 95  Resp:  [15-18] 16  BP: (119-132)/(64-84) 128/68  SpO2:  [92 %-98 %] 92 %  Body mass index is 29 21 kg/m²  Input and Output Summary (last 24 hours):        Intake/Output Summary (Last 24 hours) at 2021 1322  Last data filed at 2021 0400  Gross per 24 hour   Intake 3838 15 ml   Output 202 ml   Net 1813 15 ml       Physical Exam:     Physical Exam  (   General Appearance:    Alert, cooperative, no distress, appears stated age                               Lungs:     Decreased breath sounds at the bases       Heart:    Regular rate and rhythm, S1 and S2 normal, no murmur, rub    or gallop   Abdomen:     Soft, non-tender, bowel sounds active all four quadrants,     no masses, no organomegaly           Extremities:   Extremities normal, atraumatic, no cyanosis or edema       Additional Data:     Labs:    Results from last 7 days   Lab Units 21  0501   WBC Thousand/uL 10 92*   HEMOGLOBIN g/dL 13 1   HEMATOCRIT % 37 9   PLATELETS Thousands/uL 223   NEUTROS PCT % 82*   LYMPHS PCT % 10*   MONOS PCT % 7   EOS PCT % 0     Results from last 7 days   Lab Units 21  0501 21   SODIUM mmol/L 136 130*   POTASSIUM mmol/L 3 8 4 8   CHLORIDE mmol/L 104 91*   CO2 mmol/L 20* 14*   BUN mg/dL 8 12   CREATININE mg/dL 0 88 1 91*   ANION GAP mmol/L 12 25*   CALCIUM mg/dL 7 9* 9 1   ALBUMIN g/dL  --  3 6 TOTAL BILIRUBIN mg/dL  --  0 63   ALK PHOS U/L  --  73   ALT U/L  --  43   AST U/L  --  37   GLUCOSE RANDOM mg/dL 262* 390*         Results from last 7 days   Lab Units 07/22/21  1118 07/22/21  0841 07/22/21  0201 07/21/21  2338 07/21/21  2206 07/21/21  1957 07/21/21  1822 07/21/21  1556 07/21/21  1338 07/21/21  1139 07/21/21  1053 07/21/21  1009   POC GLUCOSE mg/dl 263* 248* 193* 125 194* 202* 113 191* 234* 151* 90 66     Results from last 7 days   Lab Units 07/20/21 2023   HEMOGLOBIN A1C % 12 9*     Results from last 7 days   Lab Units 07/22/21  0501 07/21/21  0744 07/21/21  0426 07/21/21  0423 07/21/21  0054 07/20/21  2249 07/20/21  2142   LACTIC ACID mmol/L  --  1 6 2 4*  --  2 3* 3 9*  --    PROCALCITONIN ng/ml 0 18  --   --  0 20  --   --  0 55*           * I Have Reviewed All Lab Data Listed Above  * Additional Pertinent Lab Tests Reviewed: [facility] Admission Reviewed      Recent Cultures (last 7 days):     Results from last 7 days   Lab Units 07/20/21  2142   BLOOD CULTURE  No Growth at 24 hrs  No Growth at 24 hrs         Last 24 Hours Medication List:   Current Facility-Administered Medications   Medication Dose Route Frequency Provider Last Rate    ascorbic acid  1,000 mg Oral Q12H [address] Fannie Carter PA-C      cefTRIAXone  1,000 mg Intravenous Q24H GLORY Jaimes-C 1,000 mg (07/21/21 2230)    cholecalciferol  2,000 Units Oral Daily Fannie Carter PA-C      doxycycline  100 mg Intravenous Q12H GLORY Jaimes-C 100 mg (07/22/21 1229)    enoxaparin  40 mg Subcutaneous Q24H [address] Fannie Carter PA-C      insulin glargine  30 Units Subcutaneous HS GLORY Jaimes-ANGELA      insulin lispro  1-5 Units Subcutaneous TID AC Fannie Carter PA-C      insulin lispro  10 Units Subcutaneous TID With Meals Fannie Carter PA-C      zinc sulfate  220 mg Oral Daily Fannie Carter PA-C      Followed by   Nette Russell ON 7/28/2021] multivitamin-minerals  1 tablet Oral Daily NEGRITA Jaimes polyethylene glycol  17 g Oral Daily Juan A Sanchez PA-C      remdesivir  100 mg Intravenous Q24H Juan A Sanchez PA-C      senna-docusate sodium  2 tablet Oral BID Juan A Sanchez PA-C          Today, Patient Was Seen By: Vida Zavala MD    ** Please Note: Dictation voice to text software may have been used in the creation of this document   **

## 2021-07-22 NOTE — ASSESSMENT & PLAN NOTE
Lab Results   Component Value Date    HGBA1C 12 9 (H) 07/20/2021       Recent Labs     07/21/21  2338 07/22/21  0201 07/22/21  0841 07/22/21  1118   POCGLU 125 193* 248* 263*       Blood Sugar Average: Last 72 hrs:  (P) 503 [id number]   Endocrinology evaluation was appreciated  The patient is on Lantus and also on mealtime coverage  Prescriptions were given to case management  Diabetic education pamphlet was given  Patient can hopefully go home tomorrow  Blood sugars although not ideal are stable

## 2021-07-23 VITALS
HEIGHT: 67 IN | TEMPERATURE: 97.7 F | RESPIRATION RATE: 18 BRPM | WEIGHT: 186.51 LBS | HEART RATE: 78 BPM | SYSTOLIC BLOOD PRESSURE: 123 MMHG | DIASTOLIC BLOOD PRESSURE: 72 MMHG | OXYGEN SATURATION: 95 % | BODY MASS INDEX: 29.27 KG/M2

## 2021-07-23 PROBLEM — N17.9 AKI (ACUTE KIDNEY INJURY) (HCC): Status: RESOLVED | Noted: 2021-07-20 | Resolved: 2021-07-23

## 2021-07-23 PROBLEM — E11.10 DIABETIC KETOACIDOSIS WITHOUT COMA (HCC): Status: RESOLVED | Noted: 2021-07-20 | Resolved: 2021-07-23

## 2021-07-23 LAB
ALBUMIN SERPL BCP-MCNC: 2.4 G/DL (ref 3.5–5)
ALP SERPL-CCNC: 54 U/L (ref 46–116)
ALT SERPL W P-5'-P-CCNC: 38 U/L (ref 12–78)
ANION GAP SERPL CALCULATED.3IONS-SCNC: 11 MMOL/L (ref 4–13)
AST SERPL W P-5'-P-CCNC: 39 U/L (ref 5–45)
BILIRUB SERPL-MCNC: 0.31 MG/DL (ref 0.2–1)
BUN SERPL-MCNC: 9 MG/DL (ref 5–25)
C PEPTIDE SERPL-MCNC: 0.5 NG/ML (ref 1.1–4.4)
CALCIUM ALBUM COR SERPL-MCNC: 9.1 MG/DL (ref 8.3–10.1)
CALCIUM SERPL-MCNC: 7.8 MG/DL (ref 8.3–10.1)
CHLORIDE SERPL-SCNC: 107 MMOL/L (ref 100–108)
CO2 SERPL-SCNC: 24 MMOL/L (ref 21–32)
CREAT SERPL-MCNC: 0.82 MG/DL (ref 0.6–1.3)
CRP SERPL QL: 41 MG/L
ERYTHROCYTE [DISTWIDTH] IN BLOOD BY AUTOMATED COUNT: 12.9 % (ref 11.6–15.1)
GFR SERPL CREATININE-BSD FRML MDRD: 134 ML/MIN/1.73SQ M
GLUCOSE SERPL-MCNC: 186 MG/DL (ref 65–140)
GLUCOSE SERPL-MCNC: 80 MG/DL (ref 65–140)
GLUCOSE SERPL-MCNC: 96 MG/DL (ref 65–140)
HCT VFR BLD AUTO: 36.5 % (ref 36.5–49.3)
HGB BLD-MCNC: 12.7 G/DL (ref 12–17)
MCH RBC QN AUTO: 30 PG (ref 26.8–34.3)
MCHC RBC AUTO-ENTMCNC: 34.8 G/DL (ref 31.4–37.4)
MCV RBC AUTO: 86 FL (ref 82–98)
PLATELET # BLD AUTO: 234 THOUSANDS/UL (ref 149–390)
PMV BLD AUTO: 10.3 FL (ref 8.9–12.7)
POTASSIUM SERPL-SCNC: 3 MMOL/L (ref 3.5–5.3)
PROCALCITONIN SERPL-MCNC: 0.14 NG/ML
PROT SERPL-MCNC: 6.1 G/DL (ref 6.4–8.2)
RBC # BLD AUTO: 4.24 MILLION/UL (ref 3.88–5.62)
SODIUM SERPL-SCNC: 142 MMOL/L (ref 136–145)
WBC # BLD AUTO: 7.82 THOUSAND/UL (ref 4.31–10.16)

## 2021-07-23 PROCEDURE — 82948 REAGENT STRIP/BLOOD GLUCOSE: CPT

## 2021-07-23 PROCEDURE — 99239 HOSP IP/OBS DSCHRG MGMT >30: CPT | Performed by: FAMILY MEDICINE

## 2021-07-23 PROCEDURE — 84145 PROCALCITONIN (PCT): CPT | Performed by: PHYSICIAN ASSISTANT

## 2021-07-23 PROCEDURE — 80053 COMPREHEN METABOLIC PANEL: CPT | Performed by: FAMILY MEDICINE

## 2021-07-23 PROCEDURE — 85027 COMPLETE CBC AUTOMATED: CPT | Performed by: FAMILY MEDICINE

## 2021-07-23 PROCEDURE — 86140 C-REACTIVE PROTEIN: CPT | Performed by: FAMILY MEDICINE

## 2021-07-23 RX ORDER — MELATONIN
2000 DAILY
Qty: 28 TABLET | Refills: 0 | Status: SHIPPED | OUTPATIENT
Start: 2021-07-24

## 2021-07-23 RX ORDER — POTASSIUM CHLORIDE 20 MEQ/1
40 TABLET, EXTENDED RELEASE ORAL ONCE
Status: COMPLETED | OUTPATIENT
Start: 2021-07-23 | End: 2021-07-23

## 2021-07-23 RX ORDER — POTASSIUM CHLORIDE 14.9 MG/ML
20 INJECTION INTRAVENOUS ONCE
Status: COMPLETED | OUTPATIENT
Start: 2021-07-23 | End: 2021-07-23

## 2021-07-23 RX ADMIN — ENOXAPARIN SODIUM 40 MG: 40 INJECTION SUBCUTANEOUS at 08:07

## 2021-07-23 RX ADMIN — POTASSIUM CHLORIDE 20 MEQ: 14.9 INJECTION, SOLUTION INTRAVENOUS at 11:13

## 2021-07-23 RX ADMIN — Medication 2000 UNITS: at 08:08

## 2021-07-23 RX ADMIN — DOXYCYCLINE 100 MG: 100 INJECTION, POWDER, LYOPHILIZED, FOR SOLUTION INTRAVENOUS at 11:13

## 2021-07-23 RX ADMIN — INSULIN LISPRO 1 UNITS: 100 INJECTION, SOLUTION INTRAVENOUS; SUBCUTANEOUS at 11:14

## 2021-07-23 RX ADMIN — ZINC SULFATE 220 MG (50 MG) CAPSULE 220 MG: CAPSULE at 08:08

## 2021-07-23 RX ADMIN — INSULIN LISPRO 10 UNITS: 100 INJECTION, SOLUTION INTRAVENOUS; SUBCUTANEOUS at 11:15

## 2021-07-23 RX ADMIN — OXYCODONE HYDROCHLORIDE AND ACETAMINOPHEN 1000 MG: 500 TABLET ORAL at 08:08

## 2021-07-23 RX ADMIN — POTASSIUM CHLORIDE 40 MEQ: 1500 TABLET, EXTENDED RELEASE ORAL at 11:13

## 2021-07-23 RX ADMIN — INSULIN LISPRO 10 UNITS: 100 INJECTION, SOLUTION INTRAVENOUS; SUBCUTANEOUS at 08:21

## 2021-07-23 NOTE — NURSING NOTE
Discharge instructions and avs reviewed with pt  Pt demonstrated insulin administration  Discussed signs and symptoms of hypo and hyperglycemia  Provided opportunity for questions  Pt escorted off unit, no belongings left behind

## 2021-07-23 NOTE — ASSESSMENT & PLAN NOTE
Patient is currently getting remdesivir  Patient received 3 doses of remdesivir  He is doing well and his oxygen saturations are greater than 95% at this time on room air  He is okay for discharge  Continue vitamin supplements as an outpatient  Procalcitonin levels were negative so I am going to discontinue the antibiotics on discharge

## 2021-07-23 NOTE — DISCHARGE SUMMARY
[address] [facility]  Discharge- Danica Haro 1985, 37 y o  male MRN: [id number]  Unit/Bed#:  Encounter: [id number]  Primary Care Provider: No primary care provider on file  Date and time admitted to hospital: 7/20/2021  7:36 PM    Metabolic acidosis, increased anion gap  Assessment & Plan  Secondary to DKA  This has resolved    * COVID-19  Assessment & Plan  Patient is currently getting remdesivir  Patient received 3 doses of remdesivir  He is doing well and his oxygen saturations are greater than 95% at this time on room air  He is okay for discharge  Continue vitamin supplements as an outpatient  Procalcitonin levels were negative so I am going to discontinue the antibiotics on discharge  DAYA (acute kidney injury) (HCC)-resolved as of 7/23/2021  Assessment & Plan   resolved with IV fluids    Diabetic ketoacidosis without coma (HCC)-resolved as of 7/23/2021  Assessment & Plan  Lab Results   Component Value Date    HGBA1C 12 9 (H) 07/20/2021       Recent Labs     07/22/21  1118 07/22/21  1622 07/22/21  2124 07/23/21  0803   POCGLU 263* 216* 144* 80       Blood Sugar Average: Last 72 hrs:  (P) 197 [id number]   Endocrinology evaluation was appreciated  The patient's blood sugars are not ideal but they are acceptable for discharge  Patient is going to follow up next week with a primary care physician with [facility]  Continue current regimen  Discharging Physician / Practitioner: Ailyn Harley MD  PCP: No primary care provider on file    Admission Date:   Admission Orders (From admission, onward)     Ordered        07/20/21 2202  Inpatient Admission  Once                   Discharge Date: 07/23/21    Medical Problems     Resolved Problems  Date Reviewed: 7/23/2021        Resolved    Diabetic ketoacidosis without coma ([facility] ) 7/23/2021     Resolved by  Ailyn Harley MD    DAYA (acute kidney injury) ([facility] ) 7/23/2021     Resolved by  Ailyn Harley MD Consultations During Hospital Stay:  · Endocrinology    Procedures Performed:   · Chest x-ray which shows no acute abnormality    Significant Findings / Test Results:   · COVID positive  · Hemoglobin A1c is 12 9    Incidental Findings:   · See above     Test Results Pending at Discharge (will require follow up): · None     Outpatient Tests Requested:  · Outpatient monitoring of blood sugars    Complications:  None    Reason for Admission:  Shortness of breath and cough    Hospital Course:     Ricardo Linda is a 37 y o  male patient who originally presented to the hospital on 7/20/2021 due to shortness of breath and cough    History presenting Iqra La is a 37 y o  male with no significant past medical history who presents with SOB and cough since last Thursday or Friday, states he has difficulty walking up 10 steps  Lab workup revealed high anion gap metabolic acidosis, hyperglycemia, BHB 6 7, DAYA and COVID positive test  Was found to be wheezy on physical exam, resolved with neb treatment  CXR unrevealing and remains on room air with oxygen saturation 97%  Patient denies any recent nausea, vomiting, diarrhea  Does have known family history of diabetes  Given 2L IV fluid followed by DKA fluid protocol and initiation of DKA insulin infusion  Admitted to the ICU under mild COVID pathway  Hospital course:  Patient was admitted for above reasons  He was put in the ICU secondary to having DKA needing insulin infusion  Patient was put on of the mild pathway as well  He was started on remdesivir and he was doing okay  He is on room air and has been walking around the room and doing well  I would do recommend continuing incentive spirometry as an outpatient  As far as the patient's diabetes goes  This is a new diagnosis for the patient  He was seen in consultation by endocrinology  Patient was put on Lantus as well as mealtime coverage for with the patient will be discharged on    The patient's blood sugars currently are not ideal but they were okay for discharge with close outpatient follow-up with new family doctor which was set up by case management  Patient did not have a COVID vaccine  Patient is medically stable for home with close outpatient follow-up  Please see above list of diagnoses and related plan for additional information  Condition at Discharge: fair     Discharge Day Visit / Exam:     Subjective:  Patient seen examined  He states he is feeling well today  Vitals: Blood Pressure: 123/72 (07/22/21 2300)  Pulse: 78 (07/22/21 2300)  Temperature: 97 7 °F (36 5 °C) (07/22/21 2300)  Temp Source: Oral (07/22/21 2300)  Respirations: 18 (07/22/21 2300)  Height: 5' 7" (170 2 cm) (07/20/21 2238)  Weight - Scale: 84 6 kg (186 lb 8 2 oz) (07/21/21 0559)  SpO2: 95 % (07/22/21 2300)  Exam:   Physical Exam  (   General Appearance:    Alert, cooperative, no distress, appears stated age                               Lungs:     Clear to auscultation bilaterally, respirations unlabored       Heart:    Regular rate and rhythm, S1 and S2 normal, no murmur, rub    or gallop   Abdomen:     Soft, non-tender, bowel sounds active all four quadrants,     no masses, no organomegaly           Extremities:   Extremities normal, atraumatic, no cyanosis or edema         Discharge instructions/Information to patient and family:   See after visit summary for information provided to patient and family  Provisions for Follow-Up Care:  See after visit summary for information related to follow-up care and any pertinent home health orders  Disposition:     Home    For Discharges to [facility] SNF:   · Not Applicable to this Patient - Not Applicable to this Patient    Planned Readmission:  None anticipated     Discharge Statement:  I spent 32 minutes discharging the patient  This time was spent on the day of discharge  I had direct contact with the patient on the day of discharge  Greater than 50% of the total time was spent examining patient, answering all patient questions, arranging and discussing plan of care with patient as well as directly providing post-discharge instructions  Additional time then spent on discharge activities  Discharge Medications:  See after visit summary for reconciled discharge medications provided to patient and family        ** Please Note: This note has been constructed using a voice recognition system **

## 2021-07-23 NOTE — DISCHARGE INSTRUCTIONS
[address]    Your healthcare provider and/or public health staff have evaluated you and have determined that you do not need to remain in the hospital at this time  At this time you can be isolated at home where you will be monitored by staff from your local or state health department  You should carefully follow the prevention and isolation steps below until a healthcare provider or local or state health department says that you can return to your normal activities  Stay home except to get medical care    People who are mildly ill with COVID-19 are able to isolate at home during their illness  You should restrict activities outside your home, except for getting medical care  Do not go to work, school, or public areas  Avoid using public transportation, ride-sharing, or taxis  Separate yourself from other people and animals in your home    People: As much as possible, you should stay in a specific room and away from other people in your home  Also, you should use a separate bathroom, if available  Animals: You should restrict contact with pets and other animals while you are sick with COVID-19, just like you would around other people  Although there have not been reports of pets or other animals becoming sick with COVID-19, it is still recommended that people sick with COVID-19 limit contact with animals until more information is known about the virus  When possible, have another member of your household care for your animals while you are sick  If you are sick with COVID-19, avoid contact with your pet, including petting, snuggling, being kissed or licked, and sharing food  If you must care for your pet or be around animals while you are sick, wash your hands before and after you interact with pets and wear a facemask  See COVID-19 and Animals for more information      Call ahead before visiting your doctor    If you have a medical appointment, call the healthcare provider and tell them that you have or may have COVID-19  This will help the healthcare providers office take steps to keep other people from getting infected or exposed  Wear a facemask    You should wear a facemask when you are around other people (e g , sharing a room or vehicle) or pets and before you enter a healthcare providers office  If you are not able to wear a facemask (for example, because it causes trouble breathing), then people who live with you should not stay in the same room with you, or they should wear a facemask if they enter your room  Cover your coughs and sneezes    Cover your mouth and nose with a tissue when you cough or sneeze  Throw used tissues in a lined trash can  Immediately wash your hands with soap and water for at least 20 seconds or, if soap and water are not available, clean your hands with an alcohol-based hand  that contains at least 60% alcohol  Clean your hands often    Wash your hands often with soap and water for at least 20 seconds, especially after blowing your nose, coughing, or sneezing; going to the bathroom; and before eating or preparing food  If soap and water are not readily available, use an alcohol-based hand  with at least 60% alcohol, covering all surfaces of your hands and rubbing them together until they feel dry  Soap and water are the best option if hands are visibly dirty  Avoid touching your eyes, nose, and mouth with unwashed hands  Avoid sharing personal household items    You should not share dishes, drinking glasses, cups, eating utensils, towels, or bedding with other people or pets in your home  After using these items, they should be washed thoroughly with soap and water  Clean all high-touch surfaces everyday    High touch surfaces include counters, tabletops, doorknobs, bathroom fixtures, toilets, phones, keyboards, tablets, and bedside tables  Also, clean any surfaces that may have blood, stool, or body fluids on them   Use a household cleaning spray or wipe, according to the label instructions  Labels contain instructions for safe and effective use of the cleaning product including precautions you should take when applying the product, such as wearing gloves and making sure you have good ventilation during use of the product  Monitor your symptoms    Seek prompt medical attention if your illness is worsening (e g , difficulty breathing)  Before seeking care, call your healthcare provider and tell them that you have, or are being evaluated for, COVID-19  Put on a facemask before you enter the facility  These steps will help the healthcare providers office to keep other people in the office or waiting room from getting infected or exposed  Ask your healthcare provider to call the local or [facility] health department  Persons who are placed under active monitoring or facilitated self-monitoring should follow instructions provided by their local health department or occupational health professionals, as appropriate  If you have a medical emergency and need to call 911, notify the dispatch personnel that you have, or are being evaluated for COVID-19  If possible, put on a facemask before emergency medical services arrive      Discontinuing home isolation    Patients with confirmed COVID-19 should remain under home isolation precautions until the following conditions are met:   - They have had no fever for at least 24 hours (that is one full day of no fever without the use medicine that reduces fevers)  AND  - other symptoms have improved (for example, when their cough or shortness of breath have improved)  AND  - If had mild or moderate illness, at least 10 days have passed since their symptoms first appeared or if severe illness (needed oxygen) or immunosuppressed, at least 20 days have passed since symptoms first appeared  Patients with confirmed COVID-19 should also notify close contacts (including their workplace) and ask that they self-quarantine  Currently, close contact is defined as being within 6 feet for 15 minutes or more from the period 24 hours starting 48 hours before symptom onset to the time at which the patient went into isolation  Close contacts of patients diagnosed with COVID-19 should be instructed by the patient to self-quarantine for 14 days from the last time of their last contact with the patient  Source: [employer]         How to Check Your Blood Sugar   WHAT YOU NEED TO KNOW:   Why do I need to check my blood sugar level? High blood sugar levels increase your risk for heart attack, stroke, eye problems, and kidney problems  You can decrease your risk by controlling your blood sugar levels  Low blood sugar levels can also lead to serious health problems and must be treated right away  Check your blood sugar to help you learn how food, exercise, stress, and medicines affect your levels  Keep a record of your blood sugar levels  It can be used to adjust your meal plan, exercise routine, insulin doses, or diabetes medicine if needed  How do I check my blood sugar level? · Check your blood sugar level with a glucose meter  This device uses a small drop of blood to measure your blood sugar level  Some glucose meters measure a drop of blood taken from your finger using a special lancet device  Other meters will also measure a drop of blood taken from your thigh, forearm, or the palm of your hand  · Blood sugar levels change quickly after meals, after you take insulin, during exercise, and when you feel stressed or ill  It is best to use blood from your finger to check your blood sugar level during these times  Your healthcare provider will teach you how to use a glucose meter to check your blood sugar level  Ask your healthcare provider for more information about taking blood samples from areas other than your finger         When and how often should I check my blood sugar level? Ask your healthcare provider when and how often you should check your blood sugar levels  If you check your blood sugar level before a meal , it should be between 80 and 130 mg/dL  If you check your blood sugar level 2 hours after a meal , it should be less than 180 mg/dL  Ask your healthcare provider if these are good goals for you  Blood sugar levels need to be checked more often when you are sick, there is a change to your medicine, or if you change your daily routine  Test your blood sugar level if you feel like it may be too high (hyperglycemia) or too low (hypoglycemia)  How do I keep a record of my blood sugar levels? Write down your blood sugar level each time you test it  Write down the date, the time of the test (including if it was before or after a meal), and the result  Write down the time you took your insulin or diabetes pills  Record the type and amount of insulin or diabetes medicine you took  Write comments about anything that may have made your blood sugar level go up or down  Your blood sugar level can be affected by exercise, eating more or less than usual, or stress  Bring this record with you to your follow-up visits  How do I take care of my glucose meter and test strips? Ask your healthcare provider how and how often to check the accuracy of your meter  You may need to do the following:  · Store your equipment properly  Keep the test strips away from heat, cold, and moisture  Do not take a test strip out of the container until you are ready to use it  Put the lid back tightly on the container  Do not use test strips that are damaged, wet, or bent  · Check the expiration date  on the test strip container to be sure the test strips have not   Your blood sugar readings may be wrong if you use  test strips  Use only the type of glucose test strips that work with your glucose meter    CARE AGREEMENT:   You have the right to help plan your care  Learn about your health condition and how it may be treated  Discuss treatment options with your caregivers to decide what care you want to receive  You always have the right to refuse treatment  The above information is an  only  It is not intended as medical advice for individual conditions or treatments  Talk to your doctor, nurse or pharmacist before following any medical regimen to see if it is safe and effective for you  © 2017 [employer] Information is for End User's use only and may not be sold, redistributed or otherwise used for commercial purposes  All illustrations and images included in CareNotes® are the copyrighted property of [employer] M [employer]  or [employer]

## 2021-07-23 NOTE — ASSESSMENT & PLAN NOTE
Lab Results   Component Value Date    HGBA1C 12 9 (H) 07/20/2021       Recent Labs     07/22/21  1118 07/22/21  1622 07/22/21  2124 07/23/21  0803   POCGLU 263* 216* 144* 80       Blood Sugar Average: Last 72 hrs:  (P) 197 [id number]   Endocrinology evaluation was appreciated  The patient's blood sugars are not ideal but they are acceptable for discharge  Patient is going to follow up next week with a primary care physician with [facility]  Continue current regimen

## 2021-07-24 LAB — IL6 SERPL-MCNC: 20.8 PG/ML (ref 0–13)

## 2021-07-26 LAB
BACTERIA BLD CULT: NORMAL
BACTERIA BLD CULT: NORMAL
GAD65 AB SER-ACNC: 99.5 U/ML (ref 0–5)
INSULIN AB SER-ACNC: 7.5 UU/ML

## 2021-07-26 NOTE — UTILIZATION REVIEW
Notification of Discharge   This is a Notification of Discharge from our facility [address]  Please be advised that this patient has been discharge from our facility  Below you will find the admission and discharge date and time including the patients disposition  UTILIZATION REVIEW CONTACT:  Mely Okeefe  Utilization   Network Utilization Review Department  Phone: [phone] x carefully listen to the prompts  All voicemails are confidential   Email: [email or web address]     PHYSICIAN ADVISORY SERVICES:  FOR [id number] REVIEW - MEDICAL NECESSITY DENIAL  Phone: [phone]  Fax: [phone]  Email: [email or web address]     PRESENTATION DATE: 7/20/2021  7:54 PM  OBERVATION ADMISSION DATE:   INPATIENT ADMISSION DATE: 7/20/21  9:43 PM   DISCHARGE DATE: 7/23/2021  2:36 PM  DISPOSITION: Home/Self Care Home/Self Care      IMPORTANT INFORMATION:  Send all requests for admission clinical reviews, approved or denied determinations and any other requests to dedicated fax number below belonging to the campus where the patient is receiving treatment   List of dedicated fax numbers:  [address] [place] [address] DENIALS (Administrative/Medical Necessity) [phone]   [address] [place] [address] (Maternity/NICU/Pediatrics) [phone]   Otila Johns [phone]   [facility] [phone]   [facility] [place]  [phone]   [facility] [address] [place] [address] [phone]   [facility]  [phone]   [address] [facility] [address]  [address] [facility] [address] [facility] [phone]

## 2021-07-27 LAB — PANC ISLET CELL AB TITR SER: NEGATIVE {TITER}

## 2021-08-02 ENCOUNTER — OFFICE VISIT (OUTPATIENT)
Dept: FAMILY MEDICINE CLINIC | Facility: CLINIC | Age: 36
End: 2021-08-02
Payer: COMMERCIAL

## 2021-08-02 VITALS
BODY MASS INDEX: 30.45 KG/M2 | OXYGEN SATURATION: 96 % | TEMPERATURE: 97.6 F | DIASTOLIC BLOOD PRESSURE: 82 MMHG | HEART RATE: 75 BPM | WEIGHT: 194 LBS | HEIGHT: 67 IN | SYSTOLIC BLOOD PRESSURE: 121 MMHG | RESPIRATION RATE: 19 BRPM

## 2021-08-02 DIAGNOSIS — E10.10 TYPE 1 DIABETES MELLITUS WITH KETOACIDOSIS WITHOUT COMA (HCC): Primary | ICD-10-CM

## 2021-08-02 DIAGNOSIS — U07.1 COVID-19: ICD-10-CM

## 2021-08-02 PROCEDURE — 3725F SCREEN DEPRESSION PERFORMED: CPT | Performed by: FAMILY MEDICINE

## 2021-08-02 PROCEDURE — 1111F DSCHRG MED/CURRENT MED MERGE: CPT | Performed by: FAMILY MEDICINE

## 2021-08-02 PROCEDURE — 3008F BODY MASS INDEX DOCD: CPT | Performed by: FAMILY MEDICINE

## 2021-08-02 PROCEDURE — 1036F TOBACCO NON-USER: CPT | Performed by: FAMILY MEDICINE

## 2021-08-02 PROCEDURE — 99214 OFFICE O/P EST MOD 30 MIN: CPT | Performed by: FAMILY MEDICINE

## 2021-08-02 RX ORDER — BLOOD-GLUCOSE,RECEIVER,CONT
1 EACH MISCELLANEOUS CONTINUOUS
Qty: 1 EACH | Refills: 1 | Status: SHIPPED | OUTPATIENT
Start: 2021-08-02 | End: 2021-10-14 | Stop reason: SDUPTHER

## 2021-08-02 RX ORDER — BLOOD-GLUCOSE SENSOR
3 EACH MISCELLANEOUS CONTINUOUS
Qty: 9 EACH | Refills: 3 | Status: SHIPPED | OUTPATIENT
Start: 2021-08-02 | End: 2021-10-14 | Stop reason: SDUPTHER

## 2021-08-02 RX ORDER — BLOOD-GLUCOSE TRANSMITTER
1 EACH MISCELLANEOUS CONTINUOUS
Qty: 1 EACH | Refills: 0 | Status: SHIPPED | OUTPATIENT
Start: 2021-08-02 | End: 2021-10-14 | Stop reason: SDUPTHER

## 2021-08-02 NOTE — ASSESSMENT & PLAN NOTE
Lab Results   Component Value Date    HGBA1C 12 9 (H) 07/20/2021   Discussed plan of care with patient, refer for both endocrinology and diabetic Education, will continue current insulin regimen Lantus 30  Units q h s , mealtime insulin 10 units with meals,   a CGM ordered   reviewed signs symptoms hypoglycemia, treatments,  We discussed the importance of controlling blood glucose (hemoglobin A1c less than 7  0)Premeal , even better <110  2hr after a meal <170, even better <140  A1C <7%, even better <6 5%  blood pressure control, and cholesterol to lower the risk for complications  Encouraged advise to check home blood sugars discussed goals in range of therapy    Reviewed recommendations of annual eye exams (ophthalmology) on long foot exam (Podiatry)

## 2021-08-02 NOTE — PROGRESS NOTES
BMI Counseling: Body mass index is 30 38 kg/m²  The BMI is above normal  Nutrition recommendations include decreasing portion sizes, decreasing fast food intake, limiting drinks that contain sugar, moderation in carbohydrate intake, increasing intake of lean protein, reducing intake of saturated and trans fat and reducing intake of cholesterol  Exercise recommendations include moderate physical activity 150 minutes/week and exercising 3-5 times per week  No pharmacotherapy was ordered  Right Foot/Ankle   Right Foot Inspection  Skin Exam: skin normal skin not intact, no dry skin, no warmth, no callus, no erythema, no maceration, no abnormal color, no pre-ulcer, no ulcer and no callus                          Toe Exam: ROM and strength within normal limits  Sensory       Monofilament testing: intact  Vascular    The right DP pulse is 2+  The right PT pulse is 2+  Left Foot/Ankle  Left Foot Inspection  Skin Exam: skin normalskin not intact, no dry skin, no warmth, no erythema, no maceration, normal color, no pre-ulcer, no ulcer and no callus                         Toe Exam: ROM and strength within normal limits                   Sensory       Monofilament: intact  Vascular    The left DP pulse is 2+  The left PT pulse is 2+  Assign Risk Category:  No deformity present; No loss of protective sensation; No weak pulses       Risk: 0    Assessment/Plan:     Chronic Problems:  Type 1 diabetes mellitus with ketoacidosis without coma (HCC)    Lab Results   Component Value Date    HGBA1C 12 9 (H) 07/20/2021   Discussed plan of care with patient, refer for both endocrinology and diabetic Education, will continue current insulin regimen Lantus 30  Units q h s , mealtime insulin 10 units with meals,   a CGM ordered   reviewed signs symptoms hypoglycemia, treatments,  We discussed the importance of controlling blood glucose (hemoglobin A1c less than 7  0)Premeal , even better <110  2hr after a meal <170, even better <140  A1C <7%, even better <6 5%  blood pressure control, and cholesterol to lower the risk for complications  Encouraged advise to check home blood sugars discussed goals in range of therapy  Reviewed recommendations of annual eye exams (ophthalmology) on long foot exam (Podiatry)    COVID-19   Stable, presently asymptomatic,   immunizations reviewed   call for any changes/ concerns      Visit Diagnosis:  Diagnoses and all orders for this visit:    Type 1 diabetes mellitus with ketoacidosis without coma ([facility] [address] )  -     Ambulatory referral to Endocrinology; Future  -     Comprehensive metabolic panel; Future  -     CBC; Future  -     Lipid panel; Future  -     TSH, 3rd generation; Future  -     Microalbumin / creatinine urine ratio; Future  -     Glutamic acid decarboxylase; Future  -     Continuous Blood Gluc  (Dexcom G6 ) DARIUS; Use 1 Device continuous  -     Continuous Blood Gluc Sensor (Dexcom G6 Sensor) MISC; Use 3 Devices continuous  -     Continuous Blood Gluc Transmit (Dexcom G6 Transmitter) MISC; Use 1 Device continuous  -     Ambulatory referral to Diabetic Education; Future    COVID-19          Subjective:    Patient ID: Uvaldo Martinez is a 37 y o  male      Establish  Recent dx of dka , also found to have covid   diab ,   lantus = 30 qhs   Humulin 10   Home blood sugars ave   Post meal 150 - 185   Neg hypoglycemia , denies po/yuria, neg excessive thirst   Admittedly has made some dietary changes , vegetables and lessetoh    also diagnosed with COVID, did receive treatment in hospital, has not received vaccination, presently without complaints negative shortness of breath difficulty breathing exercise intolerance, slight cough nonproductive, negative fever chills      Past med hx  None , other than recent dx hosp   shx   None     fam hx   Father 64 yr a/w, no known med issue   Mother 61 yr a/w   htn   Brother suicide   Brother 42's , a/w unk     Soc hcx  Single, SO   Hs , [facility]   -    [employer]   -smoke   + etoh social   - drug   Exercise basketball 3x/wk   Resistance trng     Supplements = mv vit d , c         The following portions of the patient's history were reviewed and updated as appropriate: allergies, current medications, past family history, past medical history, past social history, past surgical history and problem list     Review of Systems   Constitutional: Negative for appetite change, chills, fever and unexpected weight change  HENT: Negative for congestion, dental problem, ear pain, hearing loss, postnasal drip, rhinorrhea, sinus pressure, sinus pain, sneezing, sore throat, tinnitus and voice change  Eyes: Negative for visual disturbance  Respiratory: Negative for apnea, cough, chest tightness and shortness of breath  Cardiovascular: Negative for chest pain, palpitations and leg swelling  Gastrointestinal: Negative for abdominal pain, blood in stool, constipation, diarrhea, nausea and vomiting  Endocrine: Negative for cold intolerance, heat intolerance, polydipsia, polyphagia and polyuria  Genitourinary: Negative for decreased urine volume, difficulty urinating, dysuria, frequency and hematuria  Musculoskeletal: Negative for arthralgias, back pain, gait problem, joint swelling and myalgias  Skin: Negative for color change, rash and wound  Allergic/Immunologic: Negative for environmental allergies and food allergies  Neurological: Negative for dizziness, syncope, weakness, light-headedness, numbness and headaches  Hematological: Negative for adenopathy  Does not bruise/bleed easily  Psychiatric/Behavioral: Negative for sleep disturbance and suicidal ideas  The patient is not nervous/anxious            /82   Pulse 75   Temp 97 6 °F (36 4 °C)   Resp 19   Ht 5' 7" (1 702 m)   Wt 88 kg (194 lb)   SpO2 96%   BMI 30 38 kg/m²   Social History     Socioeconomic History    Marital status: Single     Spouse name: Not on file    Number of children: Not on file    Years of education: Not on file    Highest education level: Not on file   Occupational History    Not on file   Tobacco Use    Smoking status: Never Smoker    Smokeless tobacco: Never Used   Vaping Use    Vaping Use: Never used   Substance and Sexual Activity    Alcohol use: Yes     Alcohol/week: 12 0 standard drinks     Types: 12 Cans of beer per week     Comment: socially    Drug use: No    Sexual activity: Not on file   Other Topics Concern    Not on file   Social History Narrative    Not on file     Social Determinants of Health     Financial Resource Strain:     Difficulty of Paying Living Expenses:    Food Insecurity:     Worried About Running Out of Food in the Last Year:     [address] in the Last Year:    Transportation Needs:     Lack of Transportation (Medical):  Lack of Transportation (Non-Medical):    Physical Activity:     Days of Exercise per Week:     Minutes of Exercise per Session:    Stress:     Feeling of Stress :    Social Connections:     Frequency of Communication with Friends and Family:     Frequency of Social Gatherings with Friends and Family:     Attends [employer] Services:     Active Member of Clubs or Organizations:     Attends Club or Organization Meetings:     Marital Status:    Intimate Partner Violence:     Fear of Current or Ex-Partner:     Emotionally Abused:     Physically Abused:     Sexually Abused:      History reviewed  No pertinent past medical history  History reviewed  No pertinent family history  History reviewed  No pertinent surgical history      Current Outpatient Medications:     ascorbic acid (VITAMIN C) 1000 MG tablet, Take 1 tablet (1,000 mg total) by mouth every 12 (twelve) hours for 8 doses, Disp: 8 tablet, Rfl: 0    cholecalciferol (VITAMIN D3) 1,000 units tablet, Take 2 tablets (2,000 Units total) by mouth daily, Disp: 28 tablet, Rfl: 0    insulin glargine (Lantus SoloStar) 100 units/mL injection pen, Inject 30 Units under the skin daily at bedtime, Disp: 15 mL, Rfl: 0    insulin regular (HumuLIN R,NovoLIN R) 100 units/mL injection, Inject 0 1 mL (10 Units total) under the skin 3 (three) times a day with meals Please dispense, Disp: 10 mL, Rfl: 0    Continuous Blood Gluc  (Dexcom G6 ) DARIUS, Use 1 Device continuous, Disp: 1 each, Rfl: 1    Continuous Blood Gluc Sensor (Dexcom G6 Sensor) MISC, Use 3 Devices continuous, Disp: 9 each, Rfl: 3    Continuous Blood Gluc Transmit (Dexcom G6 Transmitter) MISC, Use 1 Device continuous, Disp: 1 each, Rfl: 0    No Known Allergies       Lab Review   No results displayed because visit has over 200 results  Imaging: XR chest 1 view portable    Result Date: 7/21/2021  Narrative: CHEST INDICATION:   Cough  COMPARISON:  None EXAM PERFORMED/VIEWS:  XR CHEST PORTABLE Images: 2 FINDINGS: Low lung volumes Cardiomediastinal silhouette appears unremarkable  The lungs are clear  No pneumothorax or pleural effusion  Osseous structures appear within normal limits for patient age  Impression: Low lung volumes No acute cardiopulmonary disease  Workstation performed: [id number]       Objective:     Physical Exam  Constitutional:       General: He is not in acute distress  Appearance: He is well-developed  He is not ill-appearing  HENT:      Head: Normocephalic and atraumatic  Eyes:      General: No scleral icterus  Cardiovascular:      Rate and Rhythm: Normal rate and regular rhythm  Pulses: no weak pulses          Dorsalis pedis pulses are 2+ on the right side and 2+ on the left side  Posterior tibial pulses are 2+ on the right side and 2+ on the left side  Heart sounds: Normal heart sounds  Pulmonary:      Effort: Pulmonary effort is normal       Breath sounds: Normal breath sounds  Musculoskeletal:         General: Normal range of motion  Cervical back: Normal range of motion and neck supple  Right lower leg: No edema  Left lower leg: No edema  Feet:      Right foot:      Skin integrity: No ulcer, skin breakdown, erythema, warmth, callus or dry skin  Left foot:      Skin integrity: No ulcer, skin breakdown, erythema, warmth, callus or dry skin  Lymphadenopathy:      Cervical: No cervical adenopathy  Skin:     General: Skin is warm and dry  Findings: No rash  Neurological:      Mental Status: He is alert and oriented to person, place, and time  Deep Tendon Reflexes: Reflexes are normal and symmetric  Psychiatric:         Behavior: Behavior normal          Thought Content: Thought content normal          Judgment: Judgment normal            There are no Patient Instructions on file for this visit  MEGHAN Pena    Portions of the record may have been created with voice recognition software  Occasional wrong word or "sound a like" substitutions may have occurred due to the inherent limitations of voice recognition software  Read the chart carefully and recognize, using context, where substitutions have occurred

## 2021-10-14 ENCOUNTER — AMB VIDEO VISIT (OUTPATIENT)
Dept: OTHER | Facility: HOSPITAL | Age: 36
End: 2021-10-14

## 2021-10-14 DIAGNOSIS — E10.10 TYPE 1 DIABETES MELLITUS WITH KETOACIDOSIS WITHOUT COMA (HCC): ICD-10-CM

## 2021-10-14 PROCEDURE — ECARE PR SL URGENT CARE VIRTUAL VISIT: Performed by: FAMILY MEDICINE

## 2021-10-18 RX ORDER — BLOOD-GLUCOSE,RECEIVER,CONT
1 EACH MISCELLANEOUS CONTINUOUS
Qty: 1 EACH | Refills: 0 | Status: SHIPPED | OUTPATIENT
Start: 2021-10-18

## 2021-10-18 RX ORDER — BLOOD-GLUCOSE TRANSMITTER
1 EACH MISCELLANEOUS CONTINUOUS
Qty: 1 EACH | Refills: 0 | Status: SHIPPED | OUTPATIENT
Start: 2021-10-18

## 2021-10-18 RX ORDER — BLOOD-GLUCOSE SENSOR
3 EACH MISCELLANEOUS CONTINUOUS
Qty: 9 EACH | Refills: 0 | Status: SHIPPED | OUTPATIENT
Start: 2021-10-18

## 2021-10-21 ENCOUNTER — OFFICE VISIT (OUTPATIENT)
Dept: FAMILY MEDICINE CLINIC | Facility: CLINIC | Age: 36
End: 2021-10-21
Payer: COMMERCIAL

## 2021-10-21 VITALS
WEIGHT: 196.6 LBS | BODY MASS INDEX: 30.86 KG/M2 | HEIGHT: 67 IN | HEART RATE: 72 BPM | TEMPERATURE: 97 F | OXYGEN SATURATION: 98 % | SYSTOLIC BLOOD PRESSURE: 110 MMHG | RESPIRATION RATE: 12 BRPM | DIASTOLIC BLOOD PRESSURE: 80 MMHG

## 2021-10-21 DIAGNOSIS — E11.10 DIABETIC KETOACIDOSIS WITHOUT COMA (HCC): ICD-10-CM

## 2021-10-21 DIAGNOSIS — E10.10 TYPE 1 DIABETES MELLITUS WITH KETOACIDOSIS WITHOUT COMA (HCC): Primary | ICD-10-CM

## 2021-10-21 DIAGNOSIS — R46.89 NON-COMPLIANT BEHAVIOR: ICD-10-CM

## 2021-10-21 LAB — SL AMB POCT HEMOGLOBIN AIC: 9.8 (ref ?–6.5)

## 2021-10-21 PROCEDURE — 3008F BODY MASS INDEX DOCD: CPT | Performed by: FAMILY MEDICINE

## 2021-10-21 PROCEDURE — 3046F HEMOGLOBIN A1C LEVEL >9.0%: CPT | Performed by: FAMILY MEDICINE

## 2021-10-21 PROCEDURE — 3725F SCREEN DEPRESSION PERFORMED: CPT | Performed by: FAMILY MEDICINE

## 2021-10-21 PROCEDURE — 99214 OFFICE O/P EST MOD 30 MIN: CPT | Performed by: FAMILY MEDICINE

## 2021-10-21 PROCEDURE — 1036F TOBACCO NON-USER: CPT | Performed by: FAMILY MEDICINE

## 2021-10-21 PROCEDURE — 83036 HEMOGLOBIN GLYCOSYLATED A1C: CPT | Performed by: FAMILY MEDICINE

## 2021-10-21 RX ORDER — INSULIN GLARGINE 100 [IU]/ML
30 INJECTION, SOLUTION SUBCUTANEOUS
Qty: 15 ML | Refills: 0 | Status: SHIPPED | OUTPATIENT
Start: 2021-10-21 | End: 2021-12-09 | Stop reason: SDUPTHER

## 2021-12-09 ENCOUNTER — OFFICE VISIT (OUTPATIENT)
Dept: FAMILY MEDICINE CLINIC | Facility: CLINIC | Age: 36
End: 2021-12-09
Payer: COMMERCIAL

## 2021-12-09 VITALS — HEIGHT: 67 IN | WEIGHT: 207 LBS | BODY MASS INDEX: 32.49 KG/M2

## 2021-12-09 DIAGNOSIS — E11.10 DIABETIC KETOACIDOSIS WITHOUT COMA (HCC): ICD-10-CM

## 2021-12-09 DIAGNOSIS — E10.10 TYPE 1 DIABETES MELLITUS WITH KETOACIDOSIS WITHOUT COMA (HCC): Primary | ICD-10-CM

## 2021-12-09 LAB — SL AMB POCT HEMOGLOBIN AIC: 9.5 (ref ?–6.5)

## 2021-12-09 PROCEDURE — 99213 OFFICE O/P EST LOW 20 MIN: CPT | Performed by: FAMILY MEDICINE

## 2021-12-09 PROCEDURE — 3046F HEMOGLOBIN A1C LEVEL >9.0%: CPT | Performed by: INTERNAL MEDICINE

## 2021-12-09 PROCEDURE — 83036 HEMOGLOBIN GLYCOSYLATED A1C: CPT | Performed by: FAMILY MEDICINE

## 2021-12-09 RX ORDER — FLASH GLUCOSE SENSOR
1 KIT MISCELLANEOUS CONTINUOUS
Qty: 2 EACH | Refills: 5 | Status: SHIPPED | OUTPATIENT
Start: 2021-12-09 | End: 2022-04-04 | Stop reason: SDUPTHER

## 2021-12-09 RX ORDER — INSULIN GLARGINE 100 [IU]/ML
30 INJECTION, SOLUTION SUBCUTANEOUS
Qty: 15 ML | Refills: 0 | Status: SHIPPED | OUTPATIENT
Start: 2021-12-09 | End: 2021-12-21 | Stop reason: SDUPTHER

## 2021-12-09 RX ORDER — FLASH GLUCOSE SCANNING READER
1 EACH MISCELLANEOUS CONTINUOUS
Qty: 1 EACH | Refills: 0 | Status: SHIPPED | OUTPATIENT
Start: 2021-12-09 | End: 2022-07-06 | Stop reason: SDUPTHER

## 2021-12-21 ENCOUNTER — CONSULT (OUTPATIENT)
Dept: ENDOCRINOLOGY | Facility: CLINIC | Age: 36
End: 2021-12-21
Payer: COMMERCIAL

## 2021-12-21 VITALS
SYSTOLIC BLOOD PRESSURE: 126 MMHG | BODY MASS INDEX: 32.96 KG/M2 | WEIGHT: 210 LBS | HEIGHT: 67 IN | HEART RATE: 87 BPM | DIASTOLIC BLOOD PRESSURE: 84 MMHG | OXYGEN SATURATION: 98 %

## 2021-12-21 DIAGNOSIS — E55.9 VITAMIN D DEFICIENCY: ICD-10-CM

## 2021-12-21 DIAGNOSIS — E10.65 TYPE 1 DIABETES MELLITUS WITH HYPERGLYCEMIA (HCC): Primary | ICD-10-CM

## 2021-12-21 DIAGNOSIS — E11.10 DIABETIC KETOACIDOSIS WITHOUT COMA (HCC): ICD-10-CM

## 2021-12-21 DIAGNOSIS — E10.10 TYPE 1 DIABETES MELLITUS WITH KETOACIDOSIS WITHOUT COMA (HCC): ICD-10-CM

## 2021-12-21 DIAGNOSIS — E66.9 OBESITY (BMI 30-39.9): ICD-10-CM

## 2021-12-21 PROCEDURE — 3008F BODY MASS INDEX DOCD: CPT | Performed by: INTERNAL MEDICINE

## 2021-12-21 PROCEDURE — 1036F TOBACCO NON-USER: CPT | Performed by: INTERNAL MEDICINE

## 2021-12-21 PROCEDURE — 99205 OFFICE O/P NEW HI 60 MIN: CPT | Performed by: INTERNAL MEDICINE

## 2021-12-21 RX ORDER — INSULIN GLARGINE 100 [IU]/ML
32 INJECTION, SOLUTION SUBCUTANEOUS
Qty: 15 ML | Refills: 0
Start: 2021-12-21 | End: 2022-04-15 | Stop reason: SDUPTHER

## 2021-12-21 RX ORDER — PEN NEEDLE, DIABETIC 32GX 5/32"
NEEDLE, DISPOSABLE MISCELLANEOUS
Qty: 150 EACH | Refills: 4 | Status: SHIPPED | OUTPATIENT
Start: 2021-12-21 | End: 2022-02-23 | Stop reason: SDUPTHER

## 2021-12-21 RX ORDER — INSULIN LISPRO 200 [IU]/ML
INJECTION, SOLUTION SUBCUTANEOUS
Qty: 6 ML | Refills: 4 | Status: SHIPPED | OUTPATIENT
Start: 2021-12-21 | End: 2022-02-23 | Stop reason: SDUPTHER

## 2022-01-06 ENCOUNTER — TELEPHONE (OUTPATIENT)
Dept: FAMILY MEDICINE CLINIC | Facility: CLINIC | Age: 37
End: 2022-01-06

## 2022-01-18 ENCOUNTER — OFFICE VISIT (OUTPATIENT)
Dept: DIABETES SERVICES | Facility: CLINIC | Age: 37
End: 2022-01-18
Payer: COMMERCIAL

## 2022-01-18 VITALS — HEIGHT: 67 IN | WEIGHT: 221.6 LBS | BODY MASS INDEX: 34.78 KG/M2

## 2022-01-18 DIAGNOSIS — E10.10 TYPE 1 DIABETES MELLITUS WITH KETOACIDOSIS WITHOUT COMA (HCC): Primary | ICD-10-CM

## 2022-01-18 PROCEDURE — 3008F BODY MASS INDEX DOCD: CPT | Performed by: INTERNAL MEDICINE

## 2022-01-18 PROCEDURE — G0108 DIAB MANAGE TRN  PER INDIV: HCPCS | Performed by: DIETITIAN, REGISTERED

## 2022-01-18 NOTE — PROGRESS NOTES
Carbohydrate Counting Instruction    Met with Shaun Lyons for carbohydrate counting  Shaun Lyons is currently on the following insulin regimen: Lantus 32 units qhs; Humalog 12-8-12 plus scale  Present at Session: patient     Patient Instructed on: Carbohydrate counting  Used [employer], Food labels, measuring cups, and other props to teach label reading, carbohydrate counting and food diary  Patient completed one day food diary exercise during session with minimal difficulty and needed minimal assistance  At this time, Shaun Lyons does demonstrate the math skills necessary for successful carbohydrate counting and following a flexible insulin regimen  Diabetes Education Record  Shaun Lyons received the following handouts: Portion Book, 3 day food logs, How to Count Fiber When Counting Carbohydrates      Patient response to instruction    Comprehensionvery good  Motivationvery good  Expected Compliancevery good    When food logs returned and reviewed, if appropriate, will determine flexible insulin regimen with referring provider  Will then have  call Shaun Lyons to schedule next visit  Thank you for referring your patient to [facility], it was a pleasure working with them today  Please feel free to call with any questions or concerns      Start: 2:05 pm  Stop: 3:07 pm  Referred by: Ligia Breaux MD    [facility], [address]  [place] [phone]

## 2022-02-23 DIAGNOSIS — E10.10 TYPE 1 DIABETES MELLITUS WITH KETOACIDOSIS WITHOUT COMA (HCC): ICD-10-CM

## 2022-02-23 DIAGNOSIS — E10.65 TYPE 1 DIABETES MELLITUS WITH HYPERGLYCEMIA (HCC): ICD-10-CM

## 2022-02-23 RX ORDER — INSULIN LISPRO 200 [IU]/ML
INJECTION, SOLUTION SUBCUTANEOUS
Qty: 30 ML | Refills: 1 | Status: SHIPPED | OUTPATIENT
Start: 2022-02-23 | End: 2022-03-31

## 2022-02-23 RX ORDER — PEN NEEDLE, DIABETIC 32GX 5/32"
NEEDLE, DISPOSABLE MISCELLANEOUS
Qty: 400 EACH | Refills: 1 | Status: SHIPPED | OUTPATIENT
Start: 2022-02-23 | End: 2022-07-11 | Stop reason: SDUPTHER

## 2022-03-30 NOTE — PATIENT INSTRUCTIONS
Hypoglycemia instructions   Marck Adames  3/30/2022  [id number]    Low Blood Sugar    Steps to treat low blood sugar  1  Test blood sugar if you have symptoms of low blood sugar:   Low Blood Sugar Symptoms:  o Sweaty  o Dizzy  o Rapid heartbeat  o Shaky  o Bad mood  o Hungry      2  Treat blood sugar less than 70 with 15 grams of fast-acting carbohydrate:   Examples of 15 grams Fast-Acting Carbohydrate:  o 4 oz juice  o 4 oz regular soda  o 3-4 glucose tablets (chew)  o 3-4 hard candies (chew)          3  Wait 15 minutes and test your blood sugar again     4   If blood sugar is less than 100, repeat steps 2-3     5  When your blood sugar is 100 or more, eat a snack if it will be longer than one hour until your next meal  The snack should be 15 grams of carbohydrate and a protein:   Examples of snacks:  o ½ sandwich  o 6 crackers with cheese  o Piece of fruit with cheese or peanut butter  o 6 crackers with peanut butter

## 2022-03-30 NOTE — PROGRESS NOTES
Patient Progress Note      CC: DM      Referring Provider  No referring provider defined for this encounter  History of Present Illness:   Yolanda Duncan is a 31 y o  male with a history of type 1 diabetes with long term use of insulin  Diagnosed in July 2021  Diabetes course has been stable  Complications of DM: DKA  Denies recent illness or hospitalizations  Denies recent severe hypoglycemic or severe hyperglycemic episodes  Denies any issues with his current regimen  Home glucose monitoring: are performed regularly, using Freestyle Vikki  Average glucose 157 mg/dl with glucose variability of 32 0%  In target range 64%, above range 32%, below range 4%    Current regimen: Lantus 32 units QHS, Humalog 12-8-12 units TID with meals plus scale  noncompliant some of the time, denies any side effects from medications  He is compliant with Humalog but Lantus he might miss 1-2 times a week because he falls asleep  Injects in: abdomen  Rotates sites: Yes  Hypoglycemic episodes: Yes, occasional (most days after lunch)  H/o of hypoglycemia causing hospitalization or Intervention such as glucagon injection  or ambulance call :  Yes  Hypoglycemia symptoms: jitteriness and weakness  Treatment of hypoglycemia: sandwich, "eat something," hard candy  Discussed treatment  Medic alert tag: recommended: Yes    Diabetes education: Yes   Diet: 2-3 meals per day, 2 snacks per day  Timing of meals is predictable  Diabetic diet compliance:  noncompliant some of the time  Activity: Daily activity is predictable: Yes  Plays basketball every weekend and tries to run 3 days a week  [facility]: needs diabetic eye exam  Podiatry: August 2021    Not on ACE inhibitor/ARB  Not on statin   Thyroid disorders: No  History of pancreatitis: No    Vitamin D deficiency: patient is taking 5000 IU daily       Patient Active Problem List   Diagnosis    [id number]    Metabolic acidosis, increased anion gap    [facility]    Type 1 diabetes mellitus with hyperglycemia (HCC)    Vitamin D deficiency      Past Medical History:   Diagnosis Date    Diabetes ([facility] )       History reviewed  No pertinent surgical history  History reviewed  No pertinent family history  Social History     Tobacco Use    Smoking status: Never Smoker    Smokeless tobacco: Never Used   Substance Use Topics    Alcohol use:  Yes     Alcohol/week: 12 0 standard drinks     Types: 12 Cans of beer per week     Comment: socially     No Known Allergies      Current Outpatient Medications:     ascorbic acid (VITAMIN C) 1000 MG tablet, Take 1 tablet (1,000 mg total) by mouth every 12 (twelve) hours for 8 doses, Disp: 8 tablet, Rfl: 0    cholecalciferol (VITAMIN D3) 1,000 units tablet, Take 2 tablets (2,000 Units total) by mouth daily, Disp: 28 tablet, Rfl: 0    Continuous Blood Gluc  (Dexcom G6 ) DARIUS, Use 1 Device continuous, Disp: 1 each, Rfl: 0    Continuous Blood Gluc  (FreeStyle Vikki 14 Day [place]) DARIUS, Use 1 Device continuous, Disp: 1 each, Rfl: 0    Continuous Blood Gluc Sensor (FreeStyle Vikki 14 Day Sensor) MISC, Use 1 application continuous, Disp: 2 each, Rfl: 5    insulin glargine (Lantus SoloStar) 100 units/mL injection pen, Inject 32 Units under the skin daily at bedtime, Disp: 15 mL, Rfl: 0    Insulin Pen Needle (BD Pen Needle Rosa U/F) 32G X 4 MM MISC, Use 4 times daily, Disp: 400 each, Rfl: 1    insuln lispro (HumaLOG KwikPen) 200 units/mL CONCENTRATED U-200 injection pen, Inject 12 units with breakfast and dinner and 6 units with lunch +Scale, Disp: 30 mL, Rfl: 1    Continuous Blood Gluc Sensor (Dexcom G6 Sensor) MISC, Use 3 Devices continuous (Patient not taking: Reported on 3/31/2022 ), Disp: 9 each, Rfl: 0    Continuous Blood Gluc Transmit (Dexcom G6 Transmitter) MISC, Use 1 Device continuous (Patient not taking: Reported on 3/31/2022 ), Disp: 1 each, Rfl: 0  Review of Systems   Constitutional: Negative for activity change, appetite change, fatigue and unexpected weight change  HENT: Negative for trouble swallowing  Eyes: Negative for visual disturbance  Respiratory: Negative for shortness of breath  Cardiovascular: Negative for chest pain and palpitations  Gastrointestinal: Negative for constipation and diarrhea  Endocrine: Negative for polydipsia and polyuria  Musculoskeletal: Negative  Skin: Negative for wound  Neurological: Negative for numbness  Psychiatric/Behavioral: Negative  Physical Exam:  Body mass index is 34 61 kg/m²  /90   Pulse 64   Temp (!) 97 °F (36 1 °C) (Tympanic)   Ht 5' 7" (1 702 m)   Wt 100 kg (221 lb)   BMI 34 61 kg/m²    Wt Readings from Last 3 Encounters:   03/31/22 100 kg (221 lb)   01/18/22 101 kg (221 lb 9 6 oz)   12/21/21 95 3 kg (210 lb)       Physical Exam  Vitals and nursing note reviewed  Constitutional:       Appearance: He is well-developed  HENT:      Head: Normocephalic  Eyes:      General: No scleral icterus  Pupils: Pupils are equal, round, and reactive to light  Neck:      Thyroid: No thyromegaly  Cardiovascular:      Rate and Rhythm: Normal rate and regular rhythm  Pulses:           Radial pulses are 2+ on the right side and 2+ on the left side  Heart sounds: No murmur heard  Pulmonary:      Effort: Pulmonary effort is normal  No respiratory distress  Breath sounds: Normal breath sounds  No wheezing  Musculoskeletal:      Cervical back: Neck supple  Skin:     General: Skin is warm and dry  Neurological:      Mental Status: He is alert  Patient's shoes and socks were not removed            Labs:   Component      Latest Ref Rng & Units 7/22/2021 10/21/2021 12/9/2021   Sodium      136 - 145 mmol/L 136     Potassium      3 5 - 5 3 mmol/L 3 8     Chloride      100 - 108 mmol/L 104     CO2      21 - 32 mmol/L 20 (L)     Anion Gap      4 - 13 mmol/L 12 BUN      5 - 25 mg/dL 8     Creatinine      0 60 - 1 30 mg/dL 0 88     Glucose, Random      65 - 140 mg/dL 262 (H)     Calcium      8 3 - 10 1 mg/dL 7 9 (L)     eGFR      ml/min/1 73sq m 130     ISLET CELL ANTIBODY      Neg:<1:1 Negative     C-PEPTIDE      1 1 - 4 4 ng/mL 0 5 (L)     Hemoglobin A1C      6 5  9 8 (A) 9 5 (A)       Plan:    Diagnoses and all orders for this visit:    Type 1 diabetes mellitus with hyperglycemia (HCC)  HGA1C 9 5%  Due now  Treatment regimen: decrease Humalog to 6 units at lunch due to occasional hypoglycemia after lunch  Morning BG sometimes elevated but could be due to forgetting Lantus at night  Advised to take Lantus a little earlier in the evenings (8:45 pm) when he knows he will be awake rather than waiting until right before bedtime (9:30-10 pm)  Send [employer] in 2 weeks  Discussed intensive insulin regimen does increase risk for hypoglycemia  Episodes of hypoglycemia can lead to permanent disability and death  Discussed risks/complications associated with uncontrolled diabetes  Advised to adhere to diabetic diet, and recommended staying active/exercising routinely as tolerated  Keep carbohydrates consistent to limit blood glucose fluctuations  Advised to call if blood sugars less than 70 mg/dl or over 300 mg/dl  Check blood glucose 3+ times a day  Discussed symptoms and treatment of hypoglycemia  Discussed use of CGM to collect additional blood glucose data to reveal trends and patterns that can be used to optimize treatment plan  Discussed possible use of pump in the future if he learns carb counting  Referred to diabetes/nutrition education  Recommended routine follow-up with podiatry and ophthalmology  Send log in 2 weeks  Ordered blood work to complete now and prior to next visit  -     Hemoglobin A1C; Future  -     Basic metabolic panel; Future  -     Ambulatory Referral to Ophthalmology;  Future  -     insuln lispro (HumaLOG KwikPen) 200 units/mL CONCENTRATED U-200 injection pen; Inject 12 units with breakfast and dinner and 6 units with lunch +Scale  -     Ambulatory referral to Diabetic Education; Future    Vitamin D deficiency  Check vitamin D level  Continue current supplementation  -     Vitamin D 25 hydroxy; Future       Discussed with the patient diagnosis and treatment and all questions fully answered  He will call me if any problems arise  I have spent 30 minutes with Patient  today in which greater than 50% of this time was spent in counseling/coordination of care regarding Intructions for management, Importance of tx compliance and Impressions  Counseled patient on diagnostic results, prognosis, risk and benefit of treatment options, instruction for management, importance of treatment compliance, risk factor reduction and impressions        Domenica Galan PA-C

## 2022-03-31 ENCOUNTER — OFFICE VISIT (OUTPATIENT)
Dept: ENDOCRINOLOGY | Facility: CLINIC | Age: 37
End: 2022-03-31
Payer: COMMERCIAL

## 2022-03-31 VITALS
WEIGHT: 221 LBS | DIASTOLIC BLOOD PRESSURE: 90 MMHG | HEIGHT: 67 IN | TEMPERATURE: 97 F | HEART RATE: 64 BPM | BODY MASS INDEX: 34.69 KG/M2 | SYSTOLIC BLOOD PRESSURE: 130 MMHG

## 2022-03-31 DIAGNOSIS — E10.65 TYPE 1 DIABETES MELLITUS WITH HYPERGLYCEMIA (HCC): Primary | ICD-10-CM

## 2022-03-31 DIAGNOSIS — E55.9 VITAMIN D DEFICIENCY: ICD-10-CM

## 2022-03-31 PROCEDURE — 99214 OFFICE O/P EST MOD 30 MIN: CPT | Performed by: PHYSICIAN ASSISTANT

## 2022-03-31 PROCEDURE — 95251 CONT GLUC MNTR ANALYSIS I&R: CPT | Performed by: PHYSICIAN ASSISTANT

## 2022-03-31 RX ORDER — INSULIN LISPRO 200 [IU]/ML
INJECTION, SOLUTION SUBCUTANEOUS
Qty: 30 ML | Refills: 1
Start: 2022-03-31 | End: 2022-06-07 | Stop reason: SDUPTHER

## 2022-04-04 DIAGNOSIS — E10.10 TYPE 1 DIABETES MELLITUS WITH KETOACIDOSIS WITHOUT COMA (HCC): ICD-10-CM

## 2022-04-05 RX ORDER — FLASH GLUCOSE SENSOR
1 KIT MISCELLANEOUS CONTINUOUS
Qty: 2 EACH | Refills: 0 | Status: SHIPPED | OUTPATIENT
Start: 2022-04-05 | End: 2022-06-07 | Stop reason: SDUPTHER

## 2022-04-15 DIAGNOSIS — E11.10 DIABETIC KETOACIDOSIS WITHOUT COMA (HCC): ICD-10-CM

## 2022-04-18 RX ORDER — INSULIN GLARGINE 100 [IU]/ML
32 INJECTION, SOLUTION SUBCUTANEOUS
Qty: 15 ML | Refills: 1 | Status: SHIPPED | OUTPATIENT
Start: 2022-04-18 | End: 2022-06-07 | Stop reason: SDUPTHER

## 2022-06-07 DIAGNOSIS — E11.10 DIABETIC KETOACIDOSIS WITHOUT COMA (HCC): ICD-10-CM

## 2022-06-07 DIAGNOSIS — E10.65 TYPE 1 DIABETES MELLITUS WITH HYPERGLYCEMIA (HCC): ICD-10-CM

## 2022-06-07 DIAGNOSIS — E10.10 TYPE 1 DIABETES MELLITUS WITH KETOACIDOSIS WITHOUT COMA (HCC): ICD-10-CM

## 2022-06-07 RX ORDER — INSULIN GLARGINE 100 [IU]/ML
32 INJECTION, SOLUTION SUBCUTANEOUS
Qty: 15 ML | Refills: 0 | Status: SHIPPED | OUTPATIENT
Start: 2022-06-07

## 2022-06-07 RX ORDER — INSULIN LISPRO 200 [IU]/ML
INJECTION, SOLUTION SUBCUTANEOUS
Qty: 30 ML | Refills: 0
Start: 2022-06-07 | End: 2022-06-29 | Stop reason: SDUPTHER

## 2022-06-07 RX ORDER — FLASH GLUCOSE SENSOR
1 KIT MISCELLANEOUS CONTINUOUS
Qty: 2 EACH | Refills: 0 | Status: SHIPPED | OUTPATIENT
Start: 2022-06-07

## 2022-06-28 ENCOUNTER — VBI (OUTPATIENT)
Dept: ADMINISTRATIVE | Facility: OTHER | Age: 37
End: 2022-06-28

## 2022-06-29 DIAGNOSIS — E10.65 TYPE 1 DIABETES MELLITUS WITH HYPERGLYCEMIA (HCC): ICD-10-CM

## 2022-06-30 RX ORDER — INSULIN LISPRO 200 [IU]/ML
INJECTION, SOLUTION SUBCUTANEOUS
Qty: 30 ML | Refills: 1 | Status: SHIPPED | OUTPATIENT
Start: 2022-06-30

## 2022-07-06 DIAGNOSIS — E10.10 TYPE 1 DIABETES MELLITUS WITH KETOACIDOSIS WITHOUT COMA (HCC): ICD-10-CM

## 2022-07-06 RX ORDER — FLASH GLUCOSE SCANNING READER
1 EACH MISCELLANEOUS CONTINUOUS
Qty: 1 EACH | Refills: 0 | Status: SHIPPED | OUTPATIENT
Start: 2022-07-06 | End: 2022-07-08

## 2022-07-08 DIAGNOSIS — E10.10 TYPE 1 DIABETES MELLITUS WITH KETOACIDOSIS WITHOUT COMA (HCC): ICD-10-CM

## 2022-07-08 RX ORDER — FLASH GLUCOSE SENSOR
KIT MISCELLANEOUS
Qty: 9 EACH | Refills: 1 | Status: SHIPPED | OUTPATIENT
Start: 2022-07-08 | End: 2022-08-25 | Stop reason: SDUPTHER

## 2022-07-11 DIAGNOSIS — E10.10 TYPE 1 DIABETES MELLITUS WITH KETOACIDOSIS WITHOUT COMA (HCC): ICD-10-CM

## 2022-07-11 RX ORDER — PEN NEEDLE, DIABETIC 32GX 5/32"
NEEDLE, DISPOSABLE MISCELLANEOUS
Qty: 400 EACH | Refills: 0 | Status: SHIPPED | OUTPATIENT
Start: 2022-07-11

## 2022-08-25 DIAGNOSIS — E10.10 TYPE 1 DIABETES MELLITUS WITH KETOACIDOSIS WITHOUT COMA (HCC): ICD-10-CM

## 2022-08-25 RX ORDER — FLASH GLUCOSE SENSOR
KIT MISCELLANEOUS
Qty: 9 EACH | Refills: 0 | Status: SHIPPED | OUTPATIENT
Start: 2022-08-25

## 2022-10-06 DIAGNOSIS — E10.65 TYPE 1 DIABETES MELLITUS WITH HYPERGLYCEMIA (HCC): ICD-10-CM

## 2022-10-06 RX ORDER — INSULIN LISPRO 200 [IU]/ML
INJECTION, SOLUTION SUBCUTANEOUS
Qty: 30 ML | Refills: 0 | Status: SHIPPED | OUTPATIENT
Start: 2022-10-06

## 2022-10-06 NOTE — TELEPHONE ENCOUNTER
Requested medication(s) are due for refill today yes   Patient has already received a courtesy refill: No  Other reason request has been forwarded to provider:  last office visit March 2022 -not within the protocol guidelines

## 2022-10-29 DIAGNOSIS — E11.10 DIABETIC KETOACIDOSIS WITHOUT COMA (HCC): ICD-10-CM

## 2022-10-29 DIAGNOSIS — E10.10 TYPE 1 DIABETES MELLITUS WITH KETOACIDOSIS WITHOUT COMA (HCC): ICD-10-CM

## 2022-10-31 RX ORDER — PEN NEEDLE, DIABETIC 32GX 5/32"
NEEDLE, DISPOSABLE MISCELLANEOUS
Qty: 400 EACH | Refills: 0 | Status: SHIPPED | OUTPATIENT
Start: 2022-10-31

## 2022-10-31 RX ORDER — INSULIN GLARGINE 100 [IU]/ML
32 INJECTION, SOLUTION SUBCUTANEOUS
Qty: 15 ML | Refills: 0 | Status: SHIPPED | OUTPATIENT
Start: 2022-10-31

## 2022-11-28 DIAGNOSIS — E10.10 TYPE 1 DIABETES MELLITUS WITH KETOACIDOSIS WITHOUT COMA (HCC): ICD-10-CM

## 2022-11-29 RX ORDER — PEN NEEDLE, DIABETIC 32GX 5/32"
NEEDLE, DISPOSABLE MISCELLANEOUS
Qty: 400 EACH | Refills: 0 | OUTPATIENT
Start: 2022-11-29

## 2022-11-29 RX ORDER — FLASH GLUCOSE SENSOR
1 KIT MISCELLANEOUS CONTINUOUS
Qty: 2 EACH | Refills: 0 | Status: SHIPPED | OUTPATIENT
Start: 2022-11-29

## 2023-01-06 DIAGNOSIS — E10.10 TYPE 1 DIABETES MELLITUS WITH KETOACIDOSIS WITHOUT COMA (HCC): ICD-10-CM

## 2023-01-06 RX ORDER — FLASH GLUCOSE SENSOR
KIT MISCELLANEOUS
Qty: 9 EACH | Refills: 0 | Status: SHIPPED | OUTPATIENT
Start: 2023-01-06 | End: 2023-01-09 | Stop reason: SDUPTHER

## 2023-01-09 DIAGNOSIS — E10.10 TYPE 1 DIABETES MELLITUS WITH KETOACIDOSIS WITHOUT COMA (HCC): ICD-10-CM

## 2023-01-09 RX ORDER — FLASH GLUCOSE SENSOR
KIT MISCELLANEOUS
Qty: 9 EACH | Refills: 0 | Status: SHIPPED | OUTPATIENT
Start: 2023-01-09

## 2023-01-09 NOTE — TELEPHONE ENCOUNTER
Lm for this pt he can call [place] office for an appt if closer   Pt was told he needs appt for refill

## 2023-02-02 ENCOUNTER — VBI (OUTPATIENT)
Dept: ADMINISTRATIVE | Facility: OTHER | Age: 38
End: 2023-02-02

## 2023-02-15 DIAGNOSIS — E10.65 TYPE 1 DIABETES MELLITUS WITH HYPERGLYCEMIA (HCC): ICD-10-CM

## 2023-02-16 RX ORDER — INSULIN LISPRO 200 [IU]/ML
INJECTION, SOLUTION SUBCUTANEOUS
Qty: 12 ML | Refills: 0 | Status: SHIPPED | OUTPATIENT
Start: 2023-02-16

## 2023-03-08 ENCOUNTER — OFFICE VISIT (OUTPATIENT)
Dept: ENDOCRINOLOGY | Facility: CLINIC | Age: 38
End: 2023-03-08

## 2023-03-08 VITALS
DIASTOLIC BLOOD PRESSURE: 90 MMHG | BODY MASS INDEX: 33.9 KG/M2 | TEMPERATURE: 98.2 F | SYSTOLIC BLOOD PRESSURE: 126 MMHG | WEIGHT: 216 LBS | HEART RATE: 72 BPM | HEIGHT: 67 IN

## 2023-03-08 DIAGNOSIS — Z13.220 SCREENING FOR HYPERLIPIDEMIA: ICD-10-CM

## 2023-03-08 DIAGNOSIS — E10.65 TYPE 1 DIABETES MELLITUS WITH HYPERGLYCEMIA (HCC): Primary | ICD-10-CM

## 2023-03-08 DIAGNOSIS — E55.9 VITAMIN D DEFICIENCY: ICD-10-CM

## 2023-03-08 RX ORDER — BLOOD-GLUCOSE SENSOR
EACH MISCELLANEOUS
Qty: 2 EACH | Refills: 5 | Status: SHIPPED | OUTPATIENT
Start: 2023-03-08

## 2023-03-08 RX ORDER — INSULIN LISPRO 200 [IU]/ML
INJECTION, SOLUTION SUBCUTANEOUS
Qty: 12 ML | Refills: 0
Start: 2023-03-08

## 2023-03-08 NOTE — PROGRESS NOTES
Patient Progress Note      CC: DM      Referring Provider  No referring provider defined for this encounter  History of Present Illness:   Anthony Merida is a 30 y o  male with a history of type 1 diabetes with long term use of insulin  Diagnosed in July 2021; he has not been seen for a f/u since March 2022  Diabetes course has been stable  Complications of DM: DKA  Denies recent illness or hospitalizations  Denies recent severe hypoglycemic or severe hyperglycemic episodes  Denies any issues with his current regimen  Home glucose monitoring: are performed regularly, 3 times a day     No log or meter today  He reports range from  mg/dl in the past 2 weeks; per patient readings are variable  Current regimen: Lantus 32 units QHS, Humalog 12-6-12 units TID with meals plus scale (using 12-8-12 units TID with meals plus scale)  compliant most of the time, denies any side effects from medications  Injects in: abdomen, thigh  Rotates sites: Yes  Hypoglycemic episodes: Yes, rare (usually after lunch)  H/o of hypoglycemia causing hospitalization or Intervention such as glucagon injection or ambulance call : Yes  Hypoglycemia symptoms: jitteriness, sweaty, and weakness  Treatment of hypoglycemia: candy  Discussed treatment  Medic alert tag: recommended: Yes     Diabetes education: Yes   Diet: 2 meals per day, 3 snacks per day  Timing of meals is predictable  Diabetic diet compliance: compliant most of the time  Activity: Daily activity is predictable: Yes  Tries to exercise 5 days a week     Ophthamology: needs diabetic eye exam  Podiatry: August 2021     Not on ACE inhibitor/ARB  Not on statin   Thyroid disorders: No  History of pancreatitis: No     Vitamin D deficiency: patient is taking 5000 IU daily      Patient Active Problem List   Diagnosis   • [id number]-13   • Metabolic acidosis, increased anion gap   • Type 1 diabetes mellitus with ketoacidosis without coma ([facility] [place] [address] )   • Type 1 diabetes mellitus with hyperglycemia ([facility] )   • Vitamin D deficiency      Past Medical History:   Diagnosis Date   • Diabetes ([facility] )       History reviewed  No pertinent surgical history  History reviewed  No pertinent family history  Social History     Tobacco Use   • Smoking status: Never   • Smokeless tobacco: Never   Substance Use Topics   • Alcohol use: Yes     Alcohol/week: 12 0 standard drinks     Types: 12 Cans of beer per week     Comment: socially     No Known Allergies      Current Outpatient Medications:   •  cholecalciferol (VITAMIN D3) 1,000 units tablet, Take 2 tablets (2,000 Units total) by mouth daily, Disp: 28 tablet, Rfl: 0  •  Continuous Blood Gluc Sensor (FreeStyle Vikki 3 Sensor) MISC, Apply new sensor every 2 weeks, Disp: 2 each, Rfl: 5  •  Insulin Glargine Solostar (Lantus SoloStar) 100 UNIT/ML SOPN, Inject 0 32 mL (32 Units total) under the skin daily at bedtime, Disp: 15 mL, Rfl: 0  •  Insulin Pen Needle (BD Pen Needle Rosa U/F) 32G X 4 MM MISC, Use 4 times daily, Disp: 400 each, Rfl: 0  •  insuln lispro (HumaLOG KwikPen) 200 units/mL CONCENTRATED U-200 injection pen, Inject 12 units with breakfast and dinner and 8 units with lunch +Scale (60u max daily dose), Disp: 12 mL, Rfl: 0  •  ascorbic acid (VITAMIN C) 1000 MG tablet, Take 1 tablet (1,000 mg total) by mouth every 12 (twelve) hours for 8 doses, Disp: 8 tablet, Rfl: 0  Review of Systems   Constitutional: Negative for activity change, appetite change, fatigue and unexpected weight change  HENT: Negative for trouble swallowing  Eyes: Negative for visual disturbance  Respiratory: Negative for shortness of breath  Cardiovascular: Negative for chest pain and palpitations  Gastrointestinal: Negative for constipation and diarrhea  Endocrine: Negative for polydipsia and polyuria  Musculoskeletal: Negative  Skin: Negative for wound  Neurological: Negative for numbness  Psychiatric/Behavioral: Negative          Physical Exam:  Body mass index is 33 83 kg/m²  /90   Pulse 72   Temp 98 2 °F (36 8 °C) (Skin)   Ht 5' 7" (1 702 m)   Wt 98 kg (216 lb)   BMI 33 83 kg/m²    Wt Readings from Last 3 Encounters:   03/08/23 98 kg (216 lb)   03/31/22 100 kg (221 lb)   01/18/22 101 kg (221 lb 9 6 oz)       Physical Exam  Vitals and nursing note reviewed  Constitutional:       Appearance: He is well-developed  HENT:      Head: Normocephalic  Eyes:      General: No scleral icterus  Pupils: Pupils are equal, round, and reactive to light  Neck:      Thyroid: No thyromegaly  Cardiovascular:      Rate and Rhythm: Normal rate and regular rhythm  Pulses:           Radial pulses are 2+ on the right side and 2+ on the left side  Heart sounds: No murmur heard  Pulmonary:      Effort: Pulmonary effort is normal  No respiratory distress  Breath sounds: Normal breath sounds  No wheezing  Musculoskeletal:      Cervical back: Neck supple  Skin:     General: Skin is warm and dry  Neurological:      Mental Status: He is alert  Diabetic Foot Exam    Labs:   Lab Results   Component Value Date    HGBA1C 9 5 (A) 12/09/2021     Lab Results   Component Value Date    CALCIUM 7 8 (L) 07/23/2021    K 3 0 (L) 07/23/2021    CO2 24 07/23/2021     07/23/2021    BUN 9 07/23/2021    CREATININE 0 82 07/23/2021     No results found for: Fabienne Taylor  eGFR   Date Value Ref Range Status   07/23/2021 134 ml/min/1 73sq m Final     No components found for: [facility]  Lab Results   Component Value Date    HDL 37 (L) 07/21/2021    TRIG 64 07/21/2021     Lab Results   Component Value Date    ALT 38 07/23/2021    AST 39 07/23/2021    ALKPHOS 54 07/23/2021     Lab Results   Component Value Date    OVP7CGYVWVUA 1 310 07/20/2021       Plan:    Diagnoses and all orders for this visit:    Type 1 diabetes mellitus with hyperglycemia ([facility] [address] )  HGA1C 9 5%  Patient is overdue for labs  Last labs were done in 2021    Labs ordered in 2022 were not completed either  Reordered labs today for now and for prior to next visit  Discussed with patient that he must complete labs in order to continue to receive refills; he agrees to do labs this Friday  Treatment regimen: Continue current treatment for now  Advised patient to check blood sugars at least 4 times a day and send log in 2 weeks  Also prescribed vikki 3 -share download in 2 weeks  Discussed intensive insulin regimen does increase risk for hypoglycemia  Episodes of hypoglycemia can lead to permanent disability and death  Discussed risks/complications associated with uncontrolled diabetes  Advised to adhere to diabetic diet, and recommended staying active/exercising routinely as tolerated  Keep carbohydrates consistent to limit blood glucose fluctuations  Advised to call if blood sugars less than 70 mg/dl or over 300 mg/dl  Check blood glucose 3+ times a day  Discussed symptoms and treatment of hypoglycemia  Discussed use of CGM to collect additional blood glucose data to reveal trends and patterns that can be used to optimize treatment plan  Referred to diabetes/nutrition education  Recommended routine follow-up with podiatry and ophthalmology  Send log in 1-2 weeks  Ordered blood work to complete prior to next visit  -     Hemoglobin A1C; Future  -     Basic metabolic panel; Future  -     Microalbumin / creatinine urine ratio; Future  -     Ambulatory Referral to Ophthalmology; Future  -     Hemoglobin A1C; Future  -     Basic metabolic panel; Future  -     Microalbumin / creatinine urine ratio; Future  -     insuln lispro (HumaLOG KwikPen) 200 units/mL CONCENTRATED U-200 injection pen; Inject 12 units with breakfast and dinner and 8 units with lunch +Scale (60u max daily dose)  -     Continuous Blood Gluc Sensor (FreeStyle Vikki 3 Sensor) MISC;  Apply new sensor every 2 weeks    Vitamin D deficiency  Continue vitamin D supplementation  Check vitamin D level  -     Vitamin D 25 hydroxy; Future    Screening for hyperlipidemia  Check lipid panel  -     Lipid panel; Future           Discussed with the patient diagnosis and treatment and all questions fully answered  He will call me if any problems arise  Counseled patient on diagnostic results, prognosis, risk and benefit of treatment options, instruction for management, importance of treatment compliance, risk factor reduction and impressions        Domenica Galan PA-C Single Mechanical/Accidental Fall

## 2023-03-08 NOTE — PATIENT INSTRUCTIONS
Hypoglycemia instructions   Chris Coffee  3/8/2023  [id number]    Low Blood Sugar    Steps to treat low blood sugar  1  Test blood sugar if you have symptoms of low blood sugar:   Low Blood Sugar Symptoms:  o Sweaty  o Dizzy  o Rapid heartbeat  o Shaky  o Bad mood  o Hungry      2  Treat blood sugar less than 70 with 15 grams of fast-acting carbohydrate:   Examples of 15 grams Fast-Acting Carbohydrate:  o 4 oz juice  o 4 oz regular soda  o 3-4 glucose tablets (chew)  o 3-4 hard candies (chew)          3  Wait 15 minutes and test your blood sugar again     4   If blood sugar is less than 100, repeat steps 2-3     5  When your blood sugar is 100 or more, eat a snack if it will be longer than one hour until your next meal  The snack should be 15 grams of carbohydrate and a protein:   Examples of snacks:  o ½ sandwich  o 6 crackers with cheese  o Piece of fruit with cheese or peanut butter  o 6 crackers with peanut butter

## 2023-03-10 ENCOUNTER — APPOINTMENT (OUTPATIENT)
Dept: LAB | Facility: CLINIC | Age: 38
End: 2023-03-10

## 2023-03-10 DIAGNOSIS — Z13.220 SCREENING FOR HYPERLIPIDEMIA: ICD-10-CM

## 2023-03-10 DIAGNOSIS — E10.65 TYPE 1 DIABETES MELLITUS WITH HYPERGLYCEMIA (HCC): ICD-10-CM

## 2023-03-10 DIAGNOSIS — E55.9 VITAMIN D DEFICIENCY: ICD-10-CM

## 2023-03-10 LAB
25(OH)D3 SERPL-MCNC: 19.5 NG/ML (ref 30–100)
ANION GAP SERPL CALCULATED.3IONS-SCNC: 4 MMOL/L (ref 4–13)
BUN SERPL-MCNC: 13 MG/DL (ref 5–25)
CALCIUM SERPL-MCNC: 8.8 MG/DL (ref 8.3–10.1)
CHLORIDE SERPL-SCNC: 105 MMOL/L (ref 96–108)
CHOLEST SERPL-MCNC: 177 MG/DL
CO2 SERPL-SCNC: 29 MMOL/L (ref 21–32)
CREAT SERPL-MCNC: 1 MG/DL (ref 0.6–1.3)
CREAT UR-MCNC: 185 MG/DL
GFR SERPL CREATININE-BSD FRML MDRD: 97 ML/MIN/1.73SQ M
GLUCOSE P FAST SERPL-MCNC: 161 MG/DL (ref 65–99)
HDLC SERPL-MCNC: 60 MG/DL
LDLC SERPL CALC-MCNC: 109 MG/DL (ref 0–100)
MICROALBUMIN UR-MCNC: 10.1 MG/L (ref 0–20)
MICROALBUMIN/CREAT 24H UR: 5 MG/G CREATININE (ref 0–30)
NONHDLC SERPL-MCNC: 117 MG/DL
POTASSIUM SERPL-SCNC: 4.5 MMOL/L (ref 3.5–5.3)
SODIUM SERPL-SCNC: 138 MMOL/L (ref 135–147)
TRIGL SERPL-MCNC: 41 MG/DL

## 2023-03-11 ENCOUNTER — PATIENT MESSAGE (OUTPATIENT)
Dept: ENDOCRINOLOGY | Facility: CLINIC | Age: 38
End: 2023-03-11

## 2023-03-11 LAB
EST. AVERAGE GLUCOSE BLD GHB EST-MCNC: 229 MG/DL
HBA1C MFR BLD: 9.6 %

## 2023-03-14 ENCOUNTER — TELEPHONE (OUTPATIENT)
Dept: ENDOCRINOLOGY | Facility: CLINIC | Age: 38
End: 2023-03-14

## 2023-03-14 NOTE — TELEPHONE ENCOUNTER
----- Message from Annia Tovar PA-C sent at 3/13/2023  1:03 PM EDT -----  Please call the patient regarding his abnormal result  A1c is elevated at 9 6% which suggest that average sugars are in the 200s  Fasting glucose elevated at 161  Electrolytes and kidney function are normal   LDL (unhealthy cholesterol) slightly elevated at 109, recommend healthy low-fat diet  Microalbumin creatinine ratio is normal  Vitamin D is low at 19 5  Take vitamin D3 5000 IU daily without missing doses    Since A1c is elevated, please ask patient to send blood sugar log / CGM download so adjustments can be made to treatment if necessary

## 2023-03-22 DIAGNOSIS — E10.65 TYPE 1 DIABETES MELLITUS WITH HYPERGLYCEMIA (HCC): ICD-10-CM

## 2023-03-22 RX ORDER — BLOOD-GLUCOSE SENSOR
EACH MISCELLANEOUS
Qty: 2 EACH | Refills: 0 | Status: SHIPPED | OUTPATIENT
Start: 2023-03-22

## 2023-04-06 DIAGNOSIS — E11.10 DIABETIC KETOACIDOSIS WITHOUT COMA (HCC): ICD-10-CM

## 2023-04-06 DIAGNOSIS — E10.65 TYPE 1 DIABETES MELLITUS WITH HYPERGLYCEMIA (HCC): ICD-10-CM

## 2023-04-06 RX ORDER — INSULIN LISPRO 200 [IU]/ML
INJECTION, SOLUTION SUBCUTANEOUS
Qty: 12 ML | Refills: 0 | Status: SHIPPED | OUTPATIENT
Start: 2023-04-06

## 2023-04-06 RX ORDER — INSULIN GLARGINE 100 [IU]/ML
32 INJECTION, SOLUTION SUBCUTANEOUS
Qty: 15 ML | Refills: 0 | Status: SHIPPED | OUTPATIENT
Start: 2023-04-06

## 2023-05-08 ENCOUNTER — OFFICE VISIT (OUTPATIENT)
Dept: URGENT CARE | Facility: CLINIC | Age: 38
End: 2023-05-08

## 2023-05-08 VITALS
SYSTOLIC BLOOD PRESSURE: 135 MMHG | HEART RATE: 68 BPM | TEMPERATURE: 98 F | DIASTOLIC BLOOD PRESSURE: 90 MMHG | RESPIRATION RATE: 18 BRPM | WEIGHT: 220 LBS | HEIGHT: 67 IN | BODY MASS INDEX: 34.53 KG/M2 | OXYGEN SATURATION: 97 %

## 2023-05-08 DIAGNOSIS — S39.012A STRAIN OF MUSCLE, FASCIA AND TENDON OF LOWER BACK, INITIAL ENCOUNTER: Primary | ICD-10-CM

## 2023-05-08 RX ORDER — NAPROXEN 500 MG/1
500 TABLET ORAL 2 TIMES DAILY WITH MEALS
Qty: 30 TABLET | Refills: 0 | Status: SHIPPED | OUTPATIENT
Start: 2023-05-08

## 2023-05-08 RX ORDER — CYCLOBENZAPRINE HCL 10 MG
10 TABLET ORAL 3 TIMES DAILY PRN
Qty: 21 TABLET | Refills: 0 | Status: SHIPPED | OUTPATIENT
Start: 2023-05-08

## 2023-05-08 NOTE — PROGRESS NOTES
[facility]        NAME: Luis Salinas is a 30 y o  male  : 1985    MRN: [id number]  DATE: May 8, 2023  TIME: 8:52 AM    Assessment and Plan   Strain of muscle, fascia and tendon of lower back, initial encounter [T79 [id number]]  1  Strain of muscle, fascia and tendon of lower back, initial encounter  naproxen (Naprosyn) 500 mg tablet    cyclobenzaprine (FLEXERIL) 10 mg tablet        Pt DM1 so will avoid steroids  Will try naproxen and flexeril  Advised pt to modify workout plan  Educated pt to f/u is sx persist > 1-2 weeks  Patient Instructions     Continue to monitor symptoms  If new or worsening symptoms develop, go immediately to Er  Drink plenty of fluids  Follow up with Family Doctor this week  Chief Complaint     Chief Complaint   Patient presents with   • Back Pain     4/5 days Lower back pain  Started new exercise program   Epsom salts  History of Present Illness       Back Pain  This is a new problem  Episode onset: 4 days ago  Pt states that for past week he has been doing a new 'plyometric exercise program' with 'Explosive jumping ' No specific injury noted  The problem occurs constantly  The problem is unchanged  The pain is present in the lumbar spine  The quality of the pain is described as aching  The pain radiates to the right thigh  The pain is at a severity of 7/10  The pain is worse during the day  The symptoms are aggravated by sitting  Stiffness is present all day  Pertinent negatives include no bladder incontinence, bowel incontinence, chest pain, fever, headaches, numbness, paresthesias, pelvic pain, perianal numbness or weakness  Treatments tried: Epsom salts  The treatment provided no relief  Had a lumbar strain once 10 years ago but no real PMH back injury or herniated disk  Review of Systems   Review of Systems   Constitutional: Negative  Negative for chills, fatigue and fever  HENT: Negative  Eyes: Negative  Respiratory: Negative    Negative for chest tightness, shortness of breath and wheezing  Cardiovascular: Negative  Negative for chest pain and palpitations  Gastrointestinal: Negative  Negative for bowel incontinence, diarrhea, nausea and vomiting  Endocrine: Negative  Genitourinary: Negative  Negative for bladder incontinence, flank pain, hematuria and pelvic pain  Musculoskeletal: Positive for back pain  Negative for gait problem, neck pain and neck stiffness  Skin: Negative  Negative for color change, rash and wound  Neurological: Negative for dizziness, weakness, light-headedness, numbness, headaches and paresthesias  Hematological: Negative  Psychiatric/Behavioral: Negative            Current Medications       Current Outpatient Medications:   •  cholecalciferol (VITAMIN D3) 1,000 units tablet, Take 2 tablets (2,000 Units total) by mouth daily, Disp: 28 tablet, Rfl: 0  •  Continuous Blood Gluc Sensor ([email or web address] Vikki 3 Sensor) MISC, Use daily as directed for CGM - Change every 14 days, Disp: 2 each, Rfl: 1  •  cyclobenzaprine (FLEXERIL) 10 mg tablet, Take 1 tablet (10 mg total) by mouth 3 (three) times a day as needed for muscle spasms, Disp: 21 tablet, Rfl: 0  •  Insulin Glargine Solostar (Lantus SoloStar) 100 UNIT/ML SOPN, Inject 0 32 mL (32 Units total) under the skin daily at bedtime, Disp: 15 mL, Rfl: 0  •  Insulin Pen Needle (BD Pen Needle Rosa U/F) 32G X 4 MM MISC, Use 4 times daily, Disp: 400 each, Rfl: 0  •  insuln lispro (HumaLOG KwikPen) 200 units/mL CONCENTRATED U-200 injection pen, Inject 12 units with breakfast and dinner and 8 units with lunch +Scale (60u max daily dose), Disp: 12 mL, Rfl: 0  •  naproxen (Naprosyn) 500 mg tablet, Take 1 tablet (500 mg total) by mouth 2 (two) times a day with meals, Disp: 30 tablet, Rfl: 0  •  ascorbic acid (VITAMIN C) 1000 MG tablet, Take 1 tablet (1,000 mg total) by mouth every 12 (twelve) hours for 8 doses, Disp: 8 tablet, Rfl: 0    Current Allergies     Allergies as of "05/08/2023   • (No Known Allergies)            The following portions of the patient's history were reviewed and updated as appropriate: allergies, current medications, past family history, past medical history, past social history, past surgical history and problem list      Past Medical History:   Diagnosis Date   • Diabetes ([facility] [address] )        History reviewed  No pertinent surgical history  History reviewed  No pertinent family history  Medications have been verified  Objective   /90   Pulse 68   Temp 98 °F (36 7 °C)   Resp 18   Ht 5' 7\" (1 702 m)   Wt 99 8 kg (220 lb)   SpO2 97%   BMI 34 46 kg/m²        Physical Exam     Physical Exam  Vitals and nursing note reviewed  Constitutional:       General: He is not in acute distress  Appearance: Normal appearance  He is well-developed  He is not ill-appearing or diaphoretic  HENT:      Head: Normocephalic and atraumatic  Right Ear: Tympanic membrane, ear canal and external ear normal       Left Ear: Tympanic membrane, ear canal and external ear normal    Eyes:      General:         Right eye: No discharge  Left eye: No discharge  Conjunctiva/sclera: Conjunctivae normal    Cardiovascular:      Rate and Rhythm: Normal rate and regular rhythm  Heart sounds: Normal heart sounds  Pulmonary:      Effort: Pulmonary effort is normal  No respiratory distress  Breath sounds: Normal breath sounds  No wheezing, rhonchi or rales  Abdominal:      General: Abdomen is flat  Bowel sounds are normal  There is no distension  Palpations: Abdomen is soft  Tenderness: There is no abdominal tenderness  There is no right CVA tenderness, left CVA tenderness or guarding  Musculoskeletal:      Cervical back: Normal range of motion and neck supple  Comments: Pain with anterior flexion of blow back >45 degrees  Able to twist and lateral bend with no pain  Able to toe and heel walk without pain   No midline point " tenderness  (+)R sitting root  Lymphadenopathy:      Cervical: No cervical adenopathy  Skin:     General: Skin is warm  Capillary Refill: Capillary refill takes less than 2 seconds  Coloration: Skin is not pale  Findings: No rash  Neurological:      Mental Status: He is alert

## 2023-05-08 NOTE — PATIENT INSTRUCTIONS
Continue to monitor symptoms  If new or worsening symptoms develop, go immediately to Er  Drink plenty of fluids  Follow up with Family Doctor this week  Low Back Strain   WHAT YOU NEED TO KNOW:   Low back strain is an injury to your lower back muscles or tendons  Tendons are strong tissues that connect muscles to bones  The lower back supports most of your body weight and helps you move, twist, and bend  DISCHARGE INSTRUCTIONS:   Return to the emergency department if:   You hear or feel a pop in your lower back  You have increased swelling or pain in your lower back  You have trouble moving your legs  Your legs are numb  Call your doctor if:   You have a fever  Your pain does not go away, even after treatment  You have questions or concerns about your condition or care  Medicines: The following medicines may be ordered by your healthcare provider:  Acetaminophen  decreases pain and fever  It is available without a doctor's order  Ask how much to take and how often to take it  Follow directions  Read the labels of all other medicines you are using to see if they also contain acetaminophen, or ask your doctor or pharmacist  Acetaminophen can cause liver damage if not taken correctly  NSAIDs , such as ibuprofen, help decrease swelling, pain, and fever  This medicine is available with or without a doctor's order  NSAIDs can cause stomach bleeding or kidney problems in certain people  If you take blood thinner medicine, always ask your healthcare provider if NSAIDs are safe for you  Always read the medicine label and follow directions  Muscle relaxers  help decrease pain and muscle spasms  Prescription pain medicine  may be given  Ask your healthcare provider how to take this medicine safely  Some prescription pain medicines contain acetaminophen  Do not take other medicines that contain acetaminophen without talking to your healthcare provider   Too much acetaminophen may cause liver damage  Prescription pain medicine may cause constipation  Ask your healthcare provider how to prevent or treat constipation  Take your medicine as directed  Contact your healthcare provider if you think your medicine is not helping or if you have side effects  Tell your provider if you are allergic to any medicine  Keep a list of the medicines, vitamins, and herbs you take  Include the amounts, and when and why you take them  Bring the list or the pill bottles to follow-up visits  Carry your medicine list with you in case of an emergency  Self-care:   Rest  as directed  You may need to rest in bed for a period of time after your injury  Do not lift heavy objects  Apply ice  on your back for 15 to 20 minutes every hour or as directed  Use an ice pack, or put crushed ice in a plastic bag  Cover it with a towel  Ice helps prevent tissue damage and decreases swelling and pain  Apply heat  on your lower back for 20 to 30 minutes every 2 hours for as many days as directed  Heat helps decrease pain and muscle spasms  Slowly start to increase your activity  as the pain decreases, or as directed  Prevent another low back strain:   Use correct body movements  Bend at the hips and knees when you  objects  Do not bend from the waist  Use your leg muscles as you lift the load  Do not use your back  Keep the object close to your chest as you lift it  Try not to twist or lift anything above your waist          Change your position often when you stand for long periods of time  Rest one foot on a small box or footrest, and then switch to the other foot often  Try not to sit for long periods of time  When you do, sit in a straight-backed chair with your feet flat on the floor  Never reach, pull, or push while you are sitting  Warm up before you exercise  Do exercises that strengthen your back muscles  Ask your healthcare provider about the best exercise plan for you           Maintain a healthy weight  Ask your healthcare provider how much you should weigh  Ask him to help you create a weight loss plan if you are overweight  © Copyright [facility] 2022 Information is for End User's use only and may not be sold, redistributed or otherwise used for commercial purposes  The above information is an  only  It is not intended as medical advice for individual conditions or treatments  Talk to your doctor, nurse or pharmacist before following any medical regimen to see if it is safe and effective for you

## 2023-05-08 NOTE — LETTER
May 8, 2023     Patient: Maria L Dong   YOB: 1985   Date of Visit: 5/8/2023       To Whom It May Concern: It is my medical opinion that Maria L Dong may return to work on 5/10/2023  If you have any questions or concerns, please don't hesitate to call           Sincerely,        Darren Chairez PA-C    CC: No Recipients

## 2023-05-23 ENCOUNTER — HOSPITAL ENCOUNTER (EMERGENCY)
Facility: HOSPITAL | Age: 38
Discharge: HOME | End: 2023-05-23
Attending: EMERGENCY MEDICINE

## 2023-05-23 VITALS
TEMPERATURE: 98.2 F | RESPIRATION RATE: 18 BRPM | HEART RATE: 88 BPM | DIASTOLIC BLOOD PRESSURE: 72 MMHG | SYSTOLIC BLOOD PRESSURE: 151 MMHG | OXYGEN SATURATION: 100 %

## 2023-05-23 DIAGNOSIS — M54.16 LUMBAR RADICULOPATHY, ACUTE: Primary | ICD-10-CM

## 2023-05-23 RX ORDER — IBUPROFEN 600 MG/1
600 TABLET ORAL EVERY 8 HOURS PRN
Qty: 21 TABLET | Refills: 0 | Status: SHIPPED | OUTPATIENT
Start: 2023-05-23

## 2023-05-23 RX ORDER — KETOROLAC TROMETHAMINE 30 MG/ML
15 INJECTION, SOLUTION INTRAMUSCULAR; INTRAVENOUS ONCE
Status: COMPLETED | OUTPATIENT
Start: 2023-05-23 | End: 2023-05-23

## 2023-05-23 RX ADMIN — KETOROLAC TROMETHAMINE 15 MG: 30 INJECTION, SOLUTION INTRAMUSCULAR; INTRAVENOUS at 17:01

## 2023-05-23 NOTE — Clinical Note
Bryant Smith was seen and treated in our emergency department on 5/23/2023  Diagnosis:     Mellissa Keen  may return to work on return date  He may return on this date: If you have any questions or concerns, please don't hesitate to call        Brenda Ceja PA-C    ______________________________           _______________          _______________  Hospital Representative                              Date                                Time

## 2023-05-23 NOTE — ED PROVIDER NOTES
History  Chief Complaint   Patient presents with   • Back Pain     Right low back pain radiating down back of right leg for 1 5 weeks after working out per patient  Patient reports intermittent numbness and tingling in right leg  HPI    Prior to Admission Medications   Prescriptions Last Dose Informant Patient Reported? Taking? Continuous Blood Gluc Sensor (FreeStyle Vikki 3 Sensor) MISC   No No   Sig: Use daily as directed for CGM - Change every 14 days   Insulin Glargine Solostar (Lantus SoloStar) 100 UNIT/ML SOPN   No No   Sig: Inject 0 32 mL (32 Units total) under the skin daily at bedtime   Insulin Pen Needle (BD Pen Needle Rosa U/F) 32G X 4 MM MISC   No No   Sig: Use 4 times daily   ascorbic acid (VITAMIN C) 1000 MG tablet   No No   Sig: Take 1 tablet (1,000 mg total) by mouth every 12 (twelve) hours for 8 doses   cholecalciferol (VITAMIN D3) 1,000 units tablet   No No   Sig: Take 2 tablets (2,000 Units total) by mouth daily   cyclobenzaprine (FLEXERIL) 10 mg tablet   No No   Sig: Take 1 tablet (10 mg total) by mouth 3 (three) times a day as needed for muscle spasms   insuln lispro (HumaLOG KwikPen) 200 units/mL CONCENTRATED U-200 injection pen   No No   Sig: Inject 12 units with breakfast and dinner and 8 units with lunch +Scale (60u max daily dose)   naproxen (Naprosyn) 500 mg tablet   No No   Sig: Take 1 tablet (500 mg total) by mouth 2 (two) times a day with meals      Facility-Administered Medications: None       Past Medical History:   Diagnosis Date   • Diabetes ([facility] [address] )        History reviewed  No pertinent surgical history  History reviewed  No pertinent family history  I have reviewed and agree with the history as documented      E-Cigarette/Vaping   • E-Cigarette Use Never User      E-Cigarette/Vaping Substances   • Nicotine No    • THC No    • CBD No    • Flavoring No      Social History     Tobacco Use   • Smoking status: Never   • Smokeless tobacco: Never   Vaping Use   • Vaping Use: Never used   Substance Use Topics   • Alcohol use: Yes     Alcohol/week: 12 0 standard drinks     Types: 12 Cans of beer per week     Comment: socially   • Drug use: No       Review of Systems    Physical Exam  Physical Exam    Vital Signs  ED Triage Vitals [05/23/23 1639]   Temperature Pulse Respirations Blood Pressure SpO2   98 2 °F (36 8 °C) 88 18 151/72 100 %      Temp src Heart Rate Source Patient Position - Orthostatic VS BP Location FiO2 (%)   -- Monitor -- -- --      Pain Score       --           Vitals:    05/23/23 1639   BP: 151/72   Pulse: 88         Visual Acuity  Visual Acuity    Flowsheet Row Most Recent Value   L Pupil Size (mm) 3   R Pupil Size (mm) 3          ED Medications  Medications - No data to display    Diagnostic Studies  Results Reviewed     None                 No orders to display              Procedures  Procedures         ED Course                               SBIRT 20yo+    Flowsheet Row Most Recent Value   Initial Alcohol Screen: US AUDIT-C     1  How often do you have a drink containing alcohol? 0 Filed at: 05/23/2023 1645   2  How many drinks containing alcohol do you have on a typical day you are drinking? 0 Filed at: 05/23/2023 1645   3a  Male UNDER 65: How often do you have five or more drinks on one occasion? 0 Filed at: 05/23/2023 1645   3b  FEMALE Any Age, or MALE 65+: How often do you have 4 or more drinks on one occassion? 0 Filed at: 05/23/2023 1645   Audit-C Score 0 Filed at: 05/23/2023 1645   MARY JANE: How many times in the past year have you    Used an illegal drug or used a prescription medication for non-medical reasons? Never Filed at: 05/23/2023 1645                    MDM    Disposition  Final diagnoses:   None     ED Disposition     None      Follow-up Information    None         Patient's Medications   Discharge Prescriptions    No medications on file       No discharge procedures on file      PDMP Review     None          ED Provider  Electronically Signed by Chris Crane PA-C  05/23/23 6883

## 2023-05-23 NOTE — Clinical Note
Adrien Mike was seen and treated in our emergency department on 5/23/2023  Diagnosis:     Gema Liang  may return to work on return date  He may return on this date: If you have any questions or concerns, please don't hesitate to call        Tanna Gan PA-C    ______________________________           _______________          _______________  Hospital Representative                              Date                                Time

## 2023-05-23 NOTE — Clinical Note
Virgen John was seen and treated in our emergency department on 5/23/2023  Diagnosis:     Jose Shanks  may return to work on return date  He may return on this date: 05/26/2023         If you have any questions or concerns, please don't hesitate to call        Becky Jesus PA-C    ______________________________           _______________          _______________  Hospital Representative                              Date                                Time

## 2023-05-24 ENCOUNTER — TELEPHONE (OUTPATIENT)
Dept: PHYSICAL THERAPY | Facility: OTHER | Age: 38
End: 2023-05-24

## 2023-05-26 NOTE — TELEPHONE ENCOUNTER
Call placed to the patient per [facility] referral      V/M left for patient to call back  Phone number and hours of business provided       This is the 2nd attempt to reach the patient    Will defer per protocol

## 2023-06-01 ENCOUNTER — TELEPHONE (OUTPATIENT)
Dept: FAMILY MEDICINE CLINIC | Facility: CLINIC | Age: 38
End: 2023-06-01

## 2023-06-01 ENCOUNTER — OFFICE VISIT (OUTPATIENT)
Dept: URGENT CARE | Facility: CLINIC | Age: 38
End: 2023-06-01

## 2023-06-01 VITALS
RESPIRATION RATE: 17 BRPM | OXYGEN SATURATION: 98 % | TEMPERATURE: 97.9 F | SYSTOLIC BLOOD PRESSURE: 150 MMHG | DIASTOLIC BLOOD PRESSURE: 86 MMHG | HEART RATE: 89 BPM

## 2023-06-01 DIAGNOSIS — B08.4 HAND, FOOT AND MOUTH DISEASE: Primary | ICD-10-CM

## 2023-06-01 RX ORDER — TRIAMCINOLONE ACETONIDE 0.1 %
1 PASTE (GRAM) DENTAL 2 TIMES DAILY
Qty: 5 G | Refills: 0 | Status: SHIPPED | OUTPATIENT
Start: 2023-06-01

## 2023-06-01 NOTE — LETTER
June 1, 2023     Patient: Stephanie Madrid   YOB: 1985   Date of Visit: 6/1/2023       To Whom it May Concern:    Stephanie Madrid was seen in my clinic on 6/1/2023 with Hand-Foot-Mouth Disease  Patient should remain out of work 06/01/2023-06/05/2023 and may return 06/06/2023    If you have any questions or concerns, please don't hesitate to call           Sincerely,          Saman Tejeda PA-C        CC: No Recipients

## 2023-06-01 NOTE — LETTER
June 1, 2023     Patient: Asif Degroot   YOB: 1985   Date of Visit: 6/1/2023       To Whom it May Concern:    Asif Degroot was seen in my clinic on 6/1/2023  He {Return to school/sport:69004}  If you have any questions or concerns, please don't hesitate to call           Sincerely,          Natalie Odell PA-C        CC:   No Recipients

## 2023-06-01 NOTE — PROGRESS NOTES
[facility]        NAME: Mirella Etienne is a 30 y o  male  : 1985    MRN: [id number]  DATE: 2023  TIME: 12:39 PM    Assessment and Plan   Hand, foot and mouth disease [B08 4]  1  Hand, foot and mouth disease  triamcinolone (KENALOG) 0 1 % oral topical paste        Symptoms and exam consistent with HFMD  Reviewed with patient that disease is viral and there is no specific treatment that will cure symptoms or change nature of progression  Symptom should resolve within 7-10 days  Patient provided with work excuse and Kenalog oral paste to help with oral lesion pain      Patient Instructions       Follow up with PCP in 3-5 days  Proceed to  ER if symptoms worsen  Chief Complaint     Chief Complaint   Patient presents with   • Rash     Pt states he has blisters on his hands, feet and inner and outer mouth for 3 days  History of Present Illness       Patient is a 29 yo male who presents for evaluation of blisters on his hands, feet, and inside and outside his mouth x 3 days  Patient is unaware of any contacts with same symptoms but does report being at an arc[employer] prior to symptom onset  Oral lesions are painful  No fevers, reduced PO intake  Review of Systems   Review of Systems   Constitutional: Negative for appetite change and fever  Skin: Positive for rash           Current Medications       Current Outpatient Medications:   •  cholecalciferol (VITAMIN D3) 1,000 units tablet, Take 2 tablets (2,000 Units total) by mouth daily, Disp: 28 tablet, Rfl: 0  •  Continuous Blood Gluc Sensor (FreeStyle Vikki 3 Sensor) MISC, Use daily as directed for CGM - Change every 14 days, Disp: 2 each, Rfl: 1  •  ibuprofen (MOTRIN) 600 mg tablet, Take 1 tablet (600 mg total) by mouth every 8 (eight) hours as needed for mild pain, Disp: 21 tablet, Rfl: 0  •  Insulin Glargine Solostar (Lantus SoloStar) 100 UNIT/ML SOPN, Inject 0 32 mL (32 Units total) under the skin daily at bedtime, Disp: 15 mL, Rfl: 0  •  Insulin Pen Needle (BD Pen Needle Rosa U/F) 32G X 4 MM MISC, Use 4 times daily, Disp: 400 each, Rfl: 0  •  insuln lispro (HumaLOG KwikPen) 200 units/mL CONCENTRATED U-200 injection pen, Inject 12 units with breakfast and dinner and 8 units with lunch +Scale (60u max daily dose), Disp: 12 mL, Rfl: 0  •  naproxen (Naprosyn) 500 mg tablet, Take 1 tablet (500 mg total) by mouth 2 (two) times a day with meals, Disp: 30 tablet, Rfl: 0  •  triamcinolone (KENALOG) 0 1 % oral topical paste, Apply 1 application  topically 2 (two) times a day, Disp: 5 g, Rfl: 0  •  ascorbic acid (VITAMIN C) 1000 MG tablet, Take 1 tablet (1,000 mg total) by mouth every 12 (twelve) hours for 8 doses, Disp: 8 tablet, Rfl: 0  •  cyclobenzaprine (FLEXERIL) 10 mg tablet, Take 1 tablet (10 mg total) by mouth 3 (three) times a day as needed for muscle spasms (Patient not taking: Reported on 6/1/2023), Disp: 21 tablet, Rfl: 0    Current Allergies     Allergies as of 06/01/2023   • (No Known Allergies)            The following portions of the patient's history were reviewed and updated as appropriate: allergies, current medications, past family history, past medical history, past social history, past surgical history and problem list      Past Medical History:   Diagnosis Date   • Diabetes ([facility] )        History reviewed  No pertinent surgical history  History reviewed  No pertinent family history  Medications have been verified  Objective   /86   Pulse 89   Temp 97 9 °F (36 6 °C)   Resp 17   SpO2 98%        Physical Exam     Physical Exam  Constitutional:       General: He is not in acute distress  Appearance: He is not toxic-appearing  HENT:      Mouth/Throat:      Comments: Vesicles and crusted lesions on inner and outer lips   Cardiovascular:      Rate and Rhythm: Normal rate     Pulmonary:      Effort: Pulmonary effort is normal    Skin:     Comments: Vesicles and erythematous macules on palms and soles Neurological:      Mental Status: He is alert     Psychiatric:         Mood and Affect: Mood normal          Behavior: Behavior normal

## 2023-06-15 ENCOUNTER — OFFICE VISIT (OUTPATIENT)
Dept: ENDOCRINOLOGY | Facility: CLINIC | Age: 38
End: 2023-06-15
Payer: COMMERCIAL

## 2023-06-15 VITALS
BODY MASS INDEX: 34.31 KG/M2 | HEART RATE: 76 BPM | OXYGEN SATURATION: 95 % | HEIGHT: 67 IN | WEIGHT: 218.6 LBS | DIASTOLIC BLOOD PRESSURE: 82 MMHG | SYSTOLIC BLOOD PRESSURE: 128 MMHG

## 2023-06-15 DIAGNOSIS — E78.2 MIXED HYPERLIPIDEMIA: ICD-10-CM

## 2023-06-15 DIAGNOSIS — E10.65 TYPE 1 DIABETES MELLITUS WITH HYPERGLYCEMIA (HCC): Primary | ICD-10-CM

## 2023-06-15 DIAGNOSIS — E55.9 VITAMIN D DEFICIENCY: ICD-10-CM

## 2023-06-15 LAB — SL AMB POCT HEMOGLOBIN AIC: 7.3 (ref ?–6.5)

## 2023-06-15 PROCEDURE — 99214 OFFICE O/P EST MOD 30 MIN: CPT | Performed by: PHYSICIAN ASSISTANT

## 2023-06-15 PROCEDURE — 83036 HEMOGLOBIN GLYCOSYLATED A1C: CPT | Performed by: PHYSICIAN ASSISTANT

## 2023-06-15 PROCEDURE — 95251 CONT GLUC MNTR ANALYSIS I&R: CPT | Performed by: PHYSICIAN ASSISTANT

## 2023-06-15 RX ORDER — INSULIN LISPRO 200 [IU]/ML
INJECTION, SOLUTION SUBCUTANEOUS
Qty: 12 ML | Refills: 0
Start: 2023-06-15

## 2023-06-15 NOTE — PROGRESS NOTES
Patient Progress Note      CC: DM      Referring Provider  Tara Feliciano Pa-c  [address] [facility],  [address] [facility]      History of Present Illness:   Ursula Lugo is a 30 y o  male with a history of type 1 diabetes with long term use of insulin  Diagnosed in July 2021  Diabetes course has been stable  Complications of DM: DKA  He had hand-foot-mouth disease a few weeks ago  Denies recent severe hypoglycemic or severe hyperglycemic episodes  Denies any issues with his current regimen  Home glucose monitoring: are performed regularly, 5+ times a day      Average glucose 163 mg/dl  In target range 63%, above range 36%, below range 1%     Current regimen: Lantus 32 units QHS, Humalog 12-8-12 units TID with meals plus scale (using 10-8-10 units)  compliant most of the time, denies any side effects from medications  Injects in: abdomen  Rotates sites: Yes  Hypoglycemic episodes: Yes, occasional (usually after lunch)  H/o of hypoglycemia causing hospitalization or Intervention such as glucagon injection or ambulance call : Yes  Hypoglycemia symptoms: jitteriness, sweaty, and weakness  Treatment of hypoglycemia: candy  Discussed treatment  Medic alert tag: recommended: Yes     Diabetes education: Yes   Diet: 2-3 meals per day, 1 snack per day  Timing of meals is predictable  Diabetic diet compliance: compliant most of the time  Activity: Daily activity is predictable: Yes  No routine exercise recently due to back pain  Ophthamology: needs diabetic eye exam  Podiatry: August 2021     Not on ACE inhibitor/ARB  Not on statin   Thyroid disorders: No  History of pancreatitis: No     Vitamin D deficiency: patient is taking 5000 IU daily      Patient Active Problem List   Diagnosis   • KIUFU-90   • Metabolic acidosis, increased anion gap   • Type 1 diabetes mellitus with ketoacidosis without coma ([facility] [address] )   • Type 1 diabetes mellitus with hyperglycemia (HCC)   • Vitamin D deficiency   • Mixed hyperlipidemia      Past Medical History:   Diagnosis Date   • Diabetes ([facility] [address] )       History reviewed  No pertinent surgical history  History reviewed  No pertinent family history  Social History     Tobacco Use   • Smoking status: Never   • Smokeless tobacco: Never   Substance Use Topics   • Alcohol use:  Yes     Alcohol/week: 2 0 standard drinks of alcohol     Types: 2 Glasses of wine per week     Comment: socially     No Known Allergies      Current Outpatient Medications:   •  cholecalciferol (VITAMIN D3) 1,000 units tablet, Take 2 tablets (2,000 Units total) by mouth daily, Disp: 28 tablet, Rfl: 0  •  Continuous Blood Gluc Sensor (FreeStyle Vikki 3 Sensor) MISC, Use daily as directed for CGM - Change every 14 days, Disp: 2 each, Rfl: 1  •  Insulin Glargine Solostar (Lantus SoloStar) 100 UNIT/ML SOPN, Inject 0 32 mL (32 Units total) under the skin daily at bedtime, Disp: 15 mL, Rfl: 0  •  Insulin Pen Needle (BD Pen Needle Rosa U/F) 32G X 4 MM MISC, Use 4 times daily, Disp: 400 each, Rfl: 0  •  insuln lispro (HumaLOG KwikPen) 200 units/mL CONCENTRATED U-200 injection pen, Inject 10 units with breakfast and dinner and 8 units with lunch +Scale (60u max daily dose), Disp: 12 mL, Rfl: 0  •  ascorbic acid (VITAMIN C) 1000 MG tablet, Take 1 tablet (1,000 mg total) by mouth every 12 (twelve) hours for 8 doses, Disp: 8 tablet, Rfl: 0  •  cyclobenzaprine (FLEXERIL) 10 mg tablet, Take 1 tablet (10 mg total) by mouth 3 (three) times a day as needed for muscle spasms (Patient not taking: Reported on 6/1/2023), Disp: 21 tablet, Rfl: 0  •  ibuprofen (MOTRIN) 600 mg tablet, Take 1 tablet (600 mg total) by mouth every 8 (eight) hours as needed for mild pain (Patient not taking: Reported on 6/15/2023), Disp: 21 tablet, Rfl: 0  •  naproxen (Naprosyn) 500 mg tablet, Take 1 tablet (500 mg total) by mouth 2 (two) times a day with meals (Patient not taking: Reported on 6/15/2023), Disp: 30 tablet, Rfl: 0  •  triamcinolone (KENALOG) "0 1 % oral topical paste, Apply 1 application  topically 2 (two) times a day (Patient not taking: Reported on 6/15/2023), Disp: 5 g, Rfl: 0  Review of Systems   Constitutional: Negative for activity change, appetite change, fatigue and unexpected weight change  HENT: Negative for trouble swallowing  Eyes: Negative for visual disturbance  Respiratory: Negative for shortness of breath  Cardiovascular: Negative for chest pain and palpitations  Gastrointestinal: Negative for constipation and diarrhea  Endocrine: Negative for polydipsia and polyuria  Musculoskeletal: Positive for back pain  Neurological: Negative for numbness  Psychiatric/Behavioral: Negative  Physical Exam:  Body mass index is 34 24 kg/m²  /82 (BP Location: Left arm, Patient Position: Sitting, Cuff Size: Large)   Pulse 76   Ht 5' 7\" (1 702 m)   Wt 99 2 kg (218 lb 9 6 oz)   SpO2 95%   BMI 34 24 kg/m²    Wt Readings from Last 3 Encounters:   06/15/23 99 2 kg (218 lb 9 6 oz)   05/08/23 99 8 kg (220 lb)   04/20/23 98 kg (216 lb)       Physical Exam  Vitals and nursing note reviewed  Constitutional:       Appearance: He is well-developed  HENT:      Head: Normocephalic  Eyes:      General: No scleral icterus  Pupils: Pupils are equal, round, and reactive to light  Neck:      Thyroid: No thyromegaly  Cardiovascular:      Rate and Rhythm: Normal rate and regular rhythm  Pulses: no weak pulses          Radial pulses are 2+ on the right side and 2+ on the left side  Dorsalis pedis pulses are 2+ on the right side and 2+ on the left side  Heart sounds: No murmur heard  Pulmonary:      Effort: Pulmonary effort is normal  No respiratory distress  Breath sounds: Normal breath sounds  No wheezing  Musculoskeletal:      Cervical back: Neck supple  Feet:      Right foot:      Skin integrity: No ulcer, skin breakdown, erythema, warmth, callus or dry skin        Left foot:      Skin integrity: " No ulcer, skin breakdown, erythema, warmth, callus or dry skin  Skin:     General: Skin is warm and dry  Neurological:      Mental Status: He is alert  Patient's shoes and socks removed  Right Foot/Ankle   Right Foot Inspection  Skin Exam: skin normal and skin intact  No dry skin, no warmth, no callus, no erythema, no maceration, no abnormal color, no pre-ulcer, no ulcer and no callus  Sensory   Vibration: intact  Monofilament testing: intact    Vascular  The right DP pulse is 2+  Left Foot/Ankle  Left Foot Inspection  Skin Exam: skin normal and skin intact  No dry skin, no warmth, no erythema, no maceration, normal color, no pre-ulcer, no ulcer and no callus  Sensory   Vibration: intact  Monofilament testing: intact    Vascular  The left DP pulse is 2+  Assign Risk Category  No deformity present  No loss of protective sensation  No weak pulses  Risk: 0      Labs:   Component      Latest Ref Rng & Units 3/10/2023 6/15/2023   Sodium      135 - 147 mmol/L 138    Potassium      3 5 - 5 3 mmol/L 4 5    Chloride      96 - 108 mmol/L 105    CO2      21 - 32 mmol/L 29    Anion Gap      4 - 13 mmol/L 4    BUN      5 - 25 mg/dL 13    Creatinine      0 60 - 1 30 mg/dL 1 00    GLUCOSE FASTING      65 - 99 mg/dL 161 (H)    Calcium      8 3 - 10 1 mg/dL 8 8    eGFR      ml/min/1 73sq m 97    Cholesterol      See Comment mg/dL 177    Triglycerides      See Comment mg/dL 41    HDL      >=40 mg/dL 60    LDL Calculated      0 - 100 mg/dL 109 (H)    Non-HDL Cholesterol      mg/dl 117    EXT Creatinine Urine      mg/dL 185 0    MICROALBUM ,U,RANDOM      0 0 - 20 0 mg/L 10 1    MICROALBUMIN/CREATININE RATIO      0 - 30 mg/g creatinine 5    Hemoglobin A1C      6 5 9 6 (H) 7 3 (A)   eAG, EST AVG Glucose      mg/dl 229    Vit D, 25-Hydroxy      30 0 - 100 0 ng/mL 19 5 (L)        Plan:    Diagnoses and all orders for this visit:    Type 1 diabetes mellitus with hyperglycemia (HCC)  HGA1C 7 3%  Improved  Treatment regimen: BG levels fluctuate throughout the day but this is likely due to bolusing after meals  Advised patient to bolus 10 minutes prior to meal  Continue current treatment and download CGM in 2 weeks  Discussed intensive insulin regimen does increase risk for hypoglycemia  Episodes of hypoglycemia can lead to permanent disability and death  Discussed risks/complications associated with uncontrolled diabetes  Advised to adhere to diabetic diet, and recommended staying active/exercising routinely as tolerated  Keep carbohydrates consistent to limit blood glucose fluctuations  Advised to call if blood sugars less than 70 mg/dl or over 300 mg/dl  Check blood glucose 3+ times a day  Discussed symptoms and treatment of hypoglycemia  Discussed use of CGM to collect additional blood glucose data to reveal trends and patterns that can be used to optimize treatment plan  Recommended routine follow-up with podiatry and ophthalmology  Send log in 2 weeks  Ordered blood work to complete prior to next visit  -     POCT hemoglobin A1c  -     Hemoglobin A1C; Future  -     Basic metabolic panel; Future  -     Albumin / creatinine urine ratio; Future  -     insuln lispro (HumaLOG KwikPen) 200 units/mL CONCENTRATED U-200 injection pen; Inject 10 units with breakfast and dinner and 8 units with lunch +Scale (60u max daily dose)    Vitamin D deficiency  Vitamin D previously low at 19 5  Continue current supplementation  -     Vitamin D 25 hydroxy; Future    Mixed hyperlipidemia  LDL previously 109  Not on statin therapy   Recommend healthy diet and routine exercise as tolerated        Discussed with the patient diagnosis and treatment and all questions fully answered  He will call me if any problems arise      Counseled patient on diagnostic results, prognosis, risk and benefit of treatment options, instruction for management, importance of treatment compliance, risk factor reduction and impressions        Domenica Galan PA-C

## 2023-06-15 NOTE — PATIENT INSTRUCTIONS
Hypoglycemia instructions   Lupis Mckee  6/15/2023  [id number]    Low Blood Sugar    Steps to treat low blood sugar  1  Test blood sugar if you have symptoms of low blood sugar:   Low Blood Sugar Symptoms:  o Sweaty  o Dizzy  o Rapid heartbeat  o Shaky  o Bad mood  o Hungry      2  Treat blood sugar less than 70 with 15 grams of fast-acting carbohydrate:   Examples of 15 grams Fast-Acting Carbohydrate:  o 4 oz juice  o 4 oz regular soda  o 3-4 glucose tablets (chew)  o 3-4 hard candies (chew)          3  Wait 15 minutes and test your blood sugar again     4   If blood sugar is less than 100, repeat steps 2-3     5  When your blood sugar is 100 or more, eat a snack if it will be longer than one hour until your next meal  The snack should be 15 grams of carbohydrate and a protein:   Examples of snacks:  o ½ sandwich  o 6 crackers with cheese  o Piece of fruit with cheese or peanut butter  o 6 crackers with peanut butter

## 2023-06-20 ENCOUNTER — TELEPHONE (OUTPATIENT)
Dept: FAMILY MEDICINE CLINIC | Facility: CLINIC | Age: 38
End: 2023-06-20

## 2023-08-02 DIAGNOSIS — E10.65 TYPE 1 DIABETES MELLITUS WITH HYPERGLYCEMIA (HCC): ICD-10-CM

## 2023-08-03 RX ORDER — INSULIN LISPRO 200 [IU]/ML
INJECTION, SOLUTION SUBCUTANEOUS
Qty: 12 ML | Refills: 0 | Status: SHIPPED | OUTPATIENT
Start: 2023-08-03

## 2023-09-12 DIAGNOSIS — E11.10 DIABETIC KETOACIDOSIS WITHOUT COMA (HCC): ICD-10-CM

## 2023-09-13 RX ORDER — INSULIN GLARGINE 100 [IU]/ML
32 INJECTION, SOLUTION SUBCUTANEOUS
Qty: 15 ML | Refills: 0 | Status: SHIPPED | OUTPATIENT
Start: 2023-09-13

## 2023-10-02 DIAGNOSIS — E10.65 TYPE 1 DIABETES MELLITUS WITH HYPERGLYCEMIA (HCC): ICD-10-CM

## 2023-10-02 RX ORDER — INSULIN LISPRO 200 [IU]/ML
INJECTION, SOLUTION SUBCUTANEOUS
Qty: 12 ML | Refills: 0 | Status: SHIPPED | OUTPATIENT
Start: 2023-10-02

## 2023-11-08 ENCOUNTER — OFFICE VISIT (OUTPATIENT)
Dept: FAMILY MEDICINE CLINIC | Facility: CLINIC | Age: 38
End: 2023-11-08
Payer: COMMERCIAL

## 2023-11-08 VITALS
HEART RATE: 79 BPM | WEIGHT: 227 LBS | BODY MASS INDEX: 35.63 KG/M2 | SYSTOLIC BLOOD PRESSURE: 128 MMHG | DIASTOLIC BLOOD PRESSURE: 70 MMHG | OXYGEN SATURATION: 98 % | HEIGHT: 67 IN

## 2023-11-08 DIAGNOSIS — S89.92XA LEFT KNEE INJURY, INITIAL ENCOUNTER: Primary | ICD-10-CM

## 2023-11-08 PROCEDURE — 99213 OFFICE O/P EST LOW 20 MIN: CPT | Performed by: FAMILY MEDICINE

## 2023-11-08 NOTE — PROGRESS NOTES
Depression Screening and Follow-up Plan: Patient was screened for depression during today's encounter. They screened negative with a PHQ-2 score of 0. Assessment/Plan:     Chronic Problems:  No problem-specific Assessment & Plan notes found for this encounter. Visit Diagnosis:  Diagnoses and all orders for this visit:    Left knee injury, initial encounter  Comments:  Discussed potentials with patient, recommended eval and treat physical therapy, NSAIDs as needed  Orders:  -     Ambulatory Referral to Physical Therapy; Future          Subjective:    Patient ID: Alexa Walsh is a 41 y.o. male. C/o left knee pain   Began while playing basketball ,   Sudden stop , tried to save the ball   More discomfort going up the stairs than down   Lateral aspect of the knee , at times swells         Knee Pain   The incident occurred more than 1 week ago. [place] park. The injury mechanism was a twisting injury. The pain is present in the left knee. The quality of the pain is described as aching. The pain is mild. The pain has been Fluctuating since onset. Pertinent negatives include no inability to bear weight, loss of motion, muscle weakness or numbness. Nothing aggravates the symptoms. He has tried nothing for the symptoms. The following portions of the patient's history were reviewed and updated as appropriate: allergies, current medications, past family history, past medical history, past social history, past surgical history and problem list.    Review of Systems   Neurological:  Negative for numbness.          /70   Pulse 79   Ht 5' 7" (1.702 m)   Wt 103 kg (227 lb)   SpO2 98%   BMI 35.55 kg/m²   Social History     Socioeconomic History    Marital status: Single     Spouse name: Not on file    Number of children: Not on file    Years of education: Not on file    Highest education level: Not on file   Occupational History    Not on file   Tobacco Use    Smoking status: Never    Smokeless tobacco: Never   Vaping Use    Vaping Use: Never used   Substance and Sexual Activity    Alcohol use: Yes     Alcohol/week: 2.0 standard drinks of alcohol     Types: 2 Glasses of wine per week     Comment: socially    Drug use: No    Sexual activity: Yes     Partners: Female   Other Topics Concern    Not on file   Social History Narrative    Not on file     Social Determinants of Health     Financial Resource Strain: Not on file   Food Insecurity: Not on file   Transportation Needs: Not on file   Physical Activity: Not on file   Stress: Not on file   Social Connections: Not on file   Intimate Partner Violence: Not on file   Housing Stability: Not on file     Past Medical History:   Diagnosis Date    Diabetes ([address])      No family history on file. No past surgical history on file.     Current Outpatient Medications:     cholecalciferol (VITAMIN D3) 1,000 units tablet, Take 2 tablets (2,000 Units total) by mouth daily, Disp: 28 tablet, Rfl: 0    Insulin Glargine Solostar (Lantus SoloStar) 100 UNIT/ML SOPN, Inject 0.32 mL (32 Units total) under the skin daily at bedtime, Disp: 15 mL, Rfl: 0    insuln lispro (HumaLOG KwikPen) 200 units/mL CONCENTRATED U-200 injection pen, Inject 10 units with breakfast and dinner and 8 units with lunch +Scale (60u max daily dose), Disp: 12 mL, Rfl: 0    ascorbic acid (VITAMIN C) 1000 MG tablet, Take 1 tablet (1,000 mg total) by mouth every 12 (twelve) hours for 8 doses, Disp: 8 tablet, Rfl: 0    Continuous Blood Gluc Sensor (FreeStyle Vikki 3 Sensor) MISC, Use daily as directed for CGM - Change every 14 days, Disp: 6 each, Rfl: 3    cyclobenzaprine (FLEXERIL) 10 mg tablet, Take 1 tablet (10 mg total) by mouth 3 (three) times a day as needed for muscle spasms (Patient not taking: Reported on 6/1/2023), Disp: 21 tablet, Rfl: 0    ibuprofen (MOTRIN) 600 mg tablet, Take 1 tablet (600 mg total) by mouth every 8 (eight) hours as needed for mild pain (Patient not taking: Reported on 6/15/2023), Disp: 21 tablet, Rfl: 0    Insulin Pen Needle (BD Pen Needle Rosa U/F) 32G X 4 MM MISC, Use 4 times daily, Disp: 400 each, Rfl: 0    naproxen (Naprosyn) 500 mg tablet, Take 1 tablet (500 mg total) by mouth 2 (two) times a day with meals (Patient not taking: Reported on 6/15/2023), Disp: 30 tablet, Rfl: 0    triamcinolone (KENALOG) 0.1 % oral topical paste, Apply 1 application. topically 2 (two) times a day (Patient not taking: Reported on 6/15/2023), Disp: 5 g, Rfl: 0    No Known Allergies       Lab Review   not applicable     Imaging: No results found. Objective:     Physical Exam  Constitutional:       General: He is not in acute distress. Appearance: He is not ill-appearing or toxic-appearing. HENT:      Head: Normocephalic and atraumatic. Cardiovascular:      Rate and Rhythm: Normal rate. Pulses: Normal pulses. Pulmonary:      Effort: Pulmonary effort is normal.   Musculoskeletal:         General: Normal range of motion. Right knee: Normal.      Left knee: No bony tenderness. Normal range of motion. No LCL laxity, MCL laxity, ACL laxity or PCL laxity. Abnormal meniscus. There are no Patient Instructions on file for this visit. MEGHAN Vernon    Portions of the record may have been created with voice recognition software. Occasional wrong word or "sound a like" substitutions may have occurred due to the inherent limitations of voice recognition software. Read the chart carefully and recognize, using context, where substitutions have occurred.

## 2023-12-22 DIAGNOSIS — E10.65 TYPE 1 DIABETES MELLITUS WITH HYPERGLYCEMIA (HCC): ICD-10-CM

## 2023-12-22 DIAGNOSIS — E11.10 DIABETIC KETOACIDOSIS WITHOUT COMA (HCC): ICD-10-CM

## 2023-12-22 RX ORDER — INSULIN LISPRO 200 [IU]/ML
INJECTION, SOLUTION SUBCUTANEOUS
Qty: 12 ML | Refills: 0 | Status: SHIPPED | OUTPATIENT
Start: 2023-12-22

## 2023-12-22 RX ORDER — INSULIN GLARGINE 100 [IU]/ML
32 INJECTION, SOLUTION SUBCUTANEOUS
Qty: 15 ML | Refills: 0 | Status: SHIPPED | OUTPATIENT
Start: 2023-12-22

## 2024-02-06 DIAGNOSIS — E11.10 DIABETIC KETOACIDOSIS WITHOUT COMA (HCC): ICD-10-CM

## 2024-02-08 RX ORDER — INSULIN GLARGINE 100 [IU]/ML
INJECTION, SOLUTION SUBCUTANEOUS
Qty: 15 ML | Refills: 0 | Status: SHIPPED | OUTPATIENT
Start: 2024-02-08

## 2024-02-28 DIAGNOSIS — E10.65 TYPE 1 DIABETES MELLITUS WITH HYPERGLYCEMIA (HCC): ICD-10-CM

## 2024-02-28 RX ORDER — INSULIN LISPRO 200 [IU]/ML
INJECTION, SOLUTION SUBCUTANEOUS
Qty: 12 ML | Refills: 0 | Status: SHIPPED | OUTPATIENT
Start: 2024-02-28

## 2024-02-28 NOTE — TELEPHONE ENCOUNTER
Patient called today and left him a voicemail to call the office to schedule a follow-up appointment. Also provided the patient Blayne's office number to call, since he lives up in that area.

## 2024-02-28 NOTE — TELEPHONE ENCOUNTER
Requested medication(s) are due for refill today: Yes  Patient has already received a courtesy refill: No  Other reason request has been forwarded to provider: guidelines failed. Patient called today and left him a voicemail to call the office to schedule a follow-up appointment. Also provided the patient Blayne's office number to call, since he lives up in that area.

## 2024-04-29 DIAGNOSIS — E10.65 TYPE 1 DIABETES MELLITUS WITH HYPERGLYCEMIA (HCC): ICD-10-CM

## 2024-04-30 RX ORDER — BLOOD-GLUCOSE SENSOR
EACH MISCELLANEOUS
Qty: 6 EACH | Refills: 1 | Status: SHIPPED | OUTPATIENT
Start: 2024-04-30

## 2024-06-22 DIAGNOSIS — E10.65 TYPE 1 DIABETES MELLITUS WITH HYPERGLYCEMIA (HCC): ICD-10-CM

## 2024-06-24 RX ORDER — INSULIN LISPRO 200 [IU]/ML
INJECTION, SOLUTION SUBCUTANEOUS
Qty: 12 ML | Refills: 0 | Status: SHIPPED | OUTPATIENT
Start: 2024-06-24

## 2024-07-08 ENCOUNTER — TELEPHONE (OUTPATIENT)
Dept: ENDOCRINOLOGY | Facility: CLINIC | Age: 39
End: 2024-07-08

## 2024-07-08 ENCOUNTER — OFFICE VISIT (OUTPATIENT)
Dept: ENDOCRINOLOGY | Facility: CLINIC | Age: 39
End: 2024-07-08
Payer: COMMERCIAL

## 2024-07-08 VITALS
HEART RATE: 73 BPM | SYSTOLIC BLOOD PRESSURE: 124 MMHG | OXYGEN SATURATION: 98 % | DIASTOLIC BLOOD PRESSURE: 84 MMHG | WEIGHT: 217.2 LBS | BODY MASS INDEX: 34.09 KG/M2 | HEIGHT: 67 IN

## 2024-07-08 DIAGNOSIS — Z13.29 THYROID DISORDER SCREENING: ICD-10-CM

## 2024-07-08 DIAGNOSIS — E10.65 TYPE 1 DIABETES MELLITUS WITH HYPERGLYCEMIA (HCC): Primary | ICD-10-CM

## 2024-07-08 DIAGNOSIS — E11.10 DIABETIC KETOACIDOSIS WITHOUT COMA (HCC): ICD-10-CM

## 2024-07-08 DIAGNOSIS — E01.0 THYROMEGALY: ICD-10-CM

## 2024-07-08 DIAGNOSIS — E78.2 MIXED HYPERLIPIDEMIA: ICD-10-CM

## 2024-07-08 DIAGNOSIS — E55.9 VITAMIN D DEFICIENCY: ICD-10-CM

## 2024-07-08 LAB — SL AMB POCT HEMOGLOBIN AIC: 7.8 (ref ?–6.5)

## 2024-07-08 PROCEDURE — 95251 CONT GLUC MNTR ANALYSIS I&R: CPT | Performed by: PHYSICIAN ASSISTANT

## 2024-07-08 PROCEDURE — 99214 OFFICE O/P EST MOD 30 MIN: CPT | Performed by: PHYSICIAN ASSISTANT

## 2024-07-08 PROCEDURE — 83036 HEMOGLOBIN GLYCOSYLATED A1C: CPT | Performed by: PHYSICIAN ASSISTANT

## 2024-07-08 RX ORDER — INSULIN LISPRO 200 [IU]/ML
INJECTION, SOLUTION SUBCUTANEOUS
Qty: 15 ML | Refills: 1 | Status: SHIPPED | OUTPATIENT
Start: 2024-07-08

## 2024-07-08 RX ORDER — INSULIN GLARGINE 100 [IU]/ML
INJECTION, SOLUTION SUBCUTANEOUS
Qty: 15 ML | Refills: 1 | Status: SHIPPED | OUTPATIENT
Start: 2024-07-08

## 2024-07-08 NOTE — PATIENT INSTRUCTIONS
Hypoglycemia instructions   Chip Richardson  7/8/2024  [id number]    Low Blood Sugar    Steps to treat low blood sugar.    1. Test blood sugar if you have symptoms of low blood sugar:   Low Blood Sugar Symptoms:  o Sweaty  o Dizzy  o Rapid heartbeat  o Shaky  o Bad mood  o Hungry      2. Treat blood sugar less than 70 with 15 grams of fast-acting carbohydrate:   Examples of 15 grams Fast-Acting Carbohydrate:  o 4 oz juice  o 4 oz regular soda  o 3-4 glucose tablets (chew)  o 3-4 hard candies (chew)          3.  Wait 15 minutes and test your blood sugar again     4. If blood sugar is less than 100, repeat steps 2-3.    5. When your blood sugar is 100 or more, eat a snack if it will be longer than one hour until your next meal. The snack should be 15 grams of carbohydrate and a protein:   Examples of snacks:  o ½ sandwich  o 6 crackers with cheese  o Piece of fruit with cheese or peanut butter  o 6 crackers with peanut butter

## 2024-07-08 NOTE — TELEPHONE ENCOUNTER
Rohan Metzger has a note patient needs to be seen by Dr Benitez in Nov and I don't see any appts. She said pt has not been seen by Dr Benitez since 2021 and I told pt will call him katt  Thank you

## 2024-07-08 NOTE — PROGRESS NOTES
Patient Progress Note      CC: DM      Referring Provider  Donnell Webb  [address] [address]     History of Present Illness:   Chip Richardson is a 38 y.o. male with a history of type 1 diabetes with long term use of insulin. Diagnosed in July 2021.Diabetes course has been stable. Complications of DM: DKA. He had hand-foot-mouth disease a few weeks ago. Denies recent severe hypoglycemic or severe hyperglycemic episodes. Denies any issues with his current regimen. Home glucose monitoring: are performed regularly, 5+ times a day      Average glucose 157 mg/dl  In target range 65%, above range 32%, below range 3%     Current regimen: Lantus 32 units QHS, Humalog 10-8-10 units TID with meals plus scale (using 8 units TID with meals)  compliant some of the time, denies any side effects from medications. Admits to missing Lantus 3-4 times a week prior to the past 2 weeks.   Injects in: abdomen. Rotates sites: Yes  Hypoglycemic episodes: Yes, rare (happens more when Lantus is taken)  H/o of hypoglycemia causing hospitalization or Intervention such as glucagon injection or ambulance call : Yes  Hypoglycemia symptoms: jitteriness, sweaty, and weakness  Treatment of hypoglycemia: candy. Discussed treatment.      Medic alert tag: recommended: Yes     Diabetes education: Yes   Diet: 2-3 meals per day, 2 snacks per day. Timing of meals is predictable.   Diabetic diet compliance: compliant most of the time  Activity: Daily activity is predictable: Yes. He exercises 5 days a week     Ophthamology: he did have an eye exam this year. [facility] in [place]  Podiatry: June 2023     Not on ACE inhibitor/ARB  Not on statin   Thyroid disorders: No  History of pancreatitis: No     Vitamin D deficiency: patient is taking 5000 IU daily.      Patient Active Problem List   Diagnosis    COVID-19    Metabolic acidosis, increased anion gap    Type 1 diabetes mellitus with ketoacidosis without coma  (HCC)    Type 1 diabetes mellitus with hyperglycemia (HCC)    Vitamin D deficiency    Mixed hyperlipidemia    Left knee injury, initial encounter      Past Medical History:   Diagnosis Date    Diabetes (HCC)       History reviewed. No pertinent surgical history.   History reviewed. No pertinent family history.  Social History     Tobacco Use    Smoking status: Never    Smokeless tobacco: Never   Substance Use Topics    Alcohol use: Yes     Alcohol/week: 2.0 standard drinks of alcohol     Types: 2 Glasses of wine per week     Comment: socially     No Known Allergies      Current Outpatient Medications:     cholecalciferol (VITAMIN D3) 1,000 units tablet, Take 2 tablets (2,000 Units total) by mouth daily, Disp: 28 tablet, Rfl: 0    Continuous Blood Gluc Sensor (FreeStyle Vikki 3 Sensor) MISC, Use daily as directed for CGM - Change every 14 days, Disp: 6 each, Rfl: 1    Insulin Pen Needle (BD Pen Needle Rosa U/F) 32G X 4 MM MISC, Use 4 times daily, Disp: 400 each, Rfl: 0    insuln lispro (HumaLOG KwikPen) 200 units/mL CONCENTRATED U-200 injection pen, Inject 10 units with breakfast and dinner and 8 units with lunch +Scale (60u max daily dose), Disp: 12 mL, Rfl: 0    Lantus SoloStar 100 units/mL SOPN, INJECT 32 UNITS SUBCUTANEOUSLY ONCE DAILY AT BEDTIME, Disp: 15 mL, Rfl: 0    ascorbic acid (VITAMIN C) 1000 MG tablet, Take 1 tablet (1,000 mg total) by mouth every 12 (twelve) hours for 8 doses, Disp: 8 tablet, Rfl: 0    cyclobenzaprine (FLEXERIL) 10 mg tablet, Take 1 tablet (10 mg total) by mouth 3 (three) times a day as needed for muscle spasms (Patient not taking: Reported on 6/1/2023), Disp: 21 tablet, Rfl: 0    ibuprofen (MOTRIN) 600 mg tablet, Take 1 tablet (600 mg total) by mouth every 8 (eight) hours as needed for mild pain (Patient not taking: Reported on 6/15/2023), Disp: 21 tablet, Rfl: 0    naproxen (Naprosyn) 500 mg tablet, Take 1 tablet (500 mg total) by mouth 2 (two) times a day with meals (Patient not  "taking: Reported on 6/15/2023), Disp: 30 tablet, Rfl: 0    triamcinolone (KENALOG) 0.1 % oral topical paste, Apply 1 application. topically 2 (two) times a day (Patient not taking: Reported on 6/15/2023), Disp: 5 g, Rfl: 0  Review of Systems   Constitutional:  Negative for activity change, appetite change, fatigue and unexpected weight change.   HENT:  Negative for trouble swallowing.    Eyes:  Negative for visual disturbance.   Respiratory:  Negative for shortness of breath.    Cardiovascular:  Negative for chest pain and palpitations.   Gastrointestinal:  Negative for constipation and diarrhea.   Endocrine: Negative for polydipsia and polyuria.   Musculoskeletal: Negative.    Skin:  Negative for wound.   Neurological:  Negative for numbness.   Psychiatric/Behavioral: Negative.         Physical Exam:  Body mass index is 34.02 kg/m².  /84   Pulse 73   Ht 5' 7\" (1.702 m)   Wt 98.5 kg (217 lb 3.2 oz)   SpO2 98%   BMI 34.02 kg/m²    Wt Readings from Last 3 Encounters:   07/08/24 98.5 kg (217 lb 3.2 oz)   11/08/23 103 kg (227 lb)   06/15/23 99.2 kg (218 lb 9.6 oz)       Physical Exam  Vitals and nursing note reviewed.   Constitutional:       Appearance: He is well-developed.   HENT:      Head: Normocephalic.   Eyes:      General: No scleral icterus.     Extraocular Movements: EOM normal.   Neck:      Thyroid: Thyromegaly (mild) present.   Cardiovascular:      Rate and Rhythm: Normal rate and regular rhythm.      Pulses:           Radial pulses are 2+ on the right side and 2+ on the left side.      Heart sounds: No murmur heard.  Pulmonary:      Effort: Pulmonary effort is normal. No respiratory distress.      Breath sounds: Normal breath sounds. No wheezing.   Musculoskeletal:      Cervical back: Neck supple.   Skin:     General: Skin is warm and dry.   Neurological:      Mental Status: He is alert.   Psychiatric:         Mood and Affect: Mood and affect normal.       Patient's shoes and socks were not " removed.        Labs:   Lab Results   Component Value Date    HGBA1C 7.8 (A) 07/08/2024     Component      Latest Ref Rng 3/10/2023 6/15/2023   Sodium      135 - 147 mmol/L 138     Potassium      3.5 - 5.3 mmol/L 4.5     Chloride      96 - 108 mmol/L 105     Carbon Dioxide      21 - 32 mmol/L 29     ANION GAP      4 - 13 mmol/L 4     BUN      5 - 25 mg/dL 13     Creatinine      0.60 - 1.30 mg/dL 1.00     GLUCOSE, FASTING      65 - 99 mg/dL 161 (H)     Calcium      8.3 - 10.1 mg/dL 8.8     GFR, Calculated      ml/min/1.73sq m 97     Cholesterol      See Comment mg/dL 177     Triglycerides      See Comment mg/dL 41     HDL      >=40 mg/dL 60     LDL Calculated      0 - 100 mg/dL 109 (H)     Non-HDL Cholesterol      mg/dl 117     EXT Creatinine Urine      mg/dL 185.0     Albumin,U,Random      0.0 - 20.0 mg/L 10.1     Albumin Creat Ratio      0 - 30 mg/g creatinine 5     Hemoglobin A1C      6.5  9.6 (H)  7.3 !    eAG, EST AVG Glucose      mg/dl 229     VITAMIN D, 25-HYDROXY      30.0 - 100.0 ng/mL 19.5 (L)        Legend:  (H) High  (L) Low  ! Abnormal      Plan:    Diagnoses and all orders for this visit:    Type 1 diabetes mellitus with hyperglycemia (HCC)  HGA1C 7.8%. Worsened.  Treatment regimen: Reduce Lantus to 30 units at bedtime due to occasional low blood sugar overnight.  Advised patient that he should be taking both Lantus and Humalog without missing doses as missing doses can lead to hyperglycemia which puts him at risk for diabetic ketoacidosis.  He should skip dose of Humalog if skipping meal or eating a meal with no carbohydrates.  Call to download CGM in 2 weeks.  Advised patient to complete labs  Discussed intensive insulin regimen does increase risk for hypoglycemia. Episodes of hypoglycemia can lead to permanent disability and death.  Discussed risks/complications associated with uncontrolled diabetes.    Advised to adhere to diabetic diet, and recommended staying active/exercising routinely as  tolerated.  Keep carbohydrates consistent to limit blood glucose fluctuations.  Advised to call if blood sugars less than 70 mg/dl or over 300 mg/dl.   Check blood glucose 3+ times a day  Discussed symptoms and treatment of hypoglycemia.   Discussed use of CGM to collect additional blood glucose data to reveal trends and patterns that can be used to optimize treatment plan.   Recommended routine follow-up with podiatry and ophthalmology.   Download Vikki in 2 weeks.  Ordered blood work to complete prior to next visit.  -     POCT hemoglobin A1c  -     Albumin / creatinine urine ratio; Future  -     Basic metabolic panel; Future  -     Hemoglobin A1C; Future  -     Albumin / creatinine urine ratio; Future  -     Basic metabolic panel; Future    Mixed hyperlipidemia  LDL previously 109  Not on statin   Check lipid panel  -     Lipid panel; Future    Vitamin D deficiency  Vitamin D previously low at 19.5  -     Vitamin D 25 hydroxy; Future    Thyromegaly/thyroid screening  Check TFTs  Check thyroid ultrasound as thyroid gland feels slightly enlarged on physical exam          Discussed with the patient diagnosis and treatment and all questions fully answered. He will call me if any problems arise.    Counseled patient on diagnostic results, prognosis, risk and benefit of treatment options, instruction for management, importance of treatment compliance, risk factor reduction and impressions.      Domenica Galan PA-C

## 2024-07-09 ENCOUNTER — APPOINTMENT (OUTPATIENT)
Dept: LAB | Facility: CLINIC | Age: 39
End: 2024-07-09
Payer: COMMERCIAL

## 2024-07-09 DIAGNOSIS — E10.65 TYPE 1 DIABETES MELLITUS WITH HYPERGLYCEMIA (HCC): ICD-10-CM

## 2024-07-09 DIAGNOSIS — E78.2 MIXED HYPERLIPIDEMIA: ICD-10-CM

## 2024-07-09 DIAGNOSIS — E55.9 VITAMIN D DEFICIENCY: ICD-10-CM

## 2024-07-09 DIAGNOSIS — Z13.29 THYROID DISORDER SCREENING: ICD-10-CM

## 2024-07-09 LAB
25(OH)D3 SERPL-MCNC: 30.3 NG/ML (ref 30–100)
ANION GAP SERPL CALCULATED.3IONS-SCNC: 11 MMOL/L (ref 4–13)
BUN SERPL-MCNC: 11 MG/DL (ref 5–25)
CALCIUM SERPL-MCNC: 9.1 MG/DL (ref 8.4–10.2)
CHLORIDE SERPL-SCNC: 101 MMOL/L (ref 96–108)
CHOLEST SERPL-MCNC: 207 MG/DL
CO2 SERPL-SCNC: 26 MMOL/L (ref 21–32)
CREAT SERPL-MCNC: 1.04 MG/DL (ref 0.6–1.3)
CREAT UR-MCNC: 288.4 MG/DL
GFR SERPL CREATININE-BSD FRML MDRD: 91 ML/MIN/1.73SQ M
GLUCOSE P FAST SERPL-MCNC: 79 MG/DL (ref 65–99)
HDLC SERPL-MCNC: 77 MG/DL
LDLC SERPL CALC-MCNC: 116 MG/DL (ref 0–100)
MICROALBUMIN UR-MCNC: 12.8 MG/L
MICROALBUMIN/CREAT 24H UR: 4 MG/G CREATININE (ref 0–30)
NONHDLC SERPL-MCNC: 130 MG/DL
POTASSIUM SERPL-SCNC: 3.7 MMOL/L (ref 3.5–5.3)
SODIUM SERPL-SCNC: 138 MMOL/L (ref 135–147)
T4 FREE SERPL-MCNC: 0.81 NG/DL (ref 0.61–1.12)
TRIGL SERPL-MCNC: 70 MG/DL
TSH SERPL DL<=0.05 MIU/L-ACNC: 0.8 UIU/ML (ref 0.45–4.5)

## 2024-07-09 PROCEDURE — 36415 COLL VENOUS BLD VENIPUNCTURE: CPT

## 2024-07-09 PROCEDURE — 84443 ASSAY THYROID STIM HORMONE: CPT

## 2024-07-09 PROCEDURE — 84439 ASSAY OF FREE THYROXINE: CPT

## 2024-07-09 PROCEDURE — 80048 BASIC METABOLIC PNL TOTAL CA: CPT

## 2024-07-09 PROCEDURE — 82043 UR ALBUMIN QUANTITATIVE: CPT

## 2024-07-09 PROCEDURE — 80061 LIPID PANEL: CPT

## 2024-07-09 PROCEDURE — 82570 ASSAY OF URINE CREATININE: CPT

## 2024-07-09 PROCEDURE — 82306 VITAMIN D 25 HYDROXY: CPT

## 2024-07-22 ENCOUNTER — TELEPHONE (OUTPATIENT)
Dept: FAMILY MEDICINE CLINIC | Facility: CLINIC | Age: 39
End: 2024-07-22

## 2024-07-22 ENCOUNTER — OFFICE VISIT (OUTPATIENT)
Dept: URGENT CARE | Facility: CLINIC | Age: 39
End: 2024-07-22
Payer: COMMERCIAL

## 2024-07-22 VITALS
TEMPERATURE: 97.3 F | SYSTOLIC BLOOD PRESSURE: 126 MMHG | OXYGEN SATURATION: 99 % | DIASTOLIC BLOOD PRESSURE: 86 MMHG | RESPIRATION RATE: 18 BRPM | HEART RATE: 72 BPM

## 2024-07-22 DIAGNOSIS — K13.0 INFECTION OF LIP: ICD-10-CM

## 2024-07-22 DIAGNOSIS — B08.4 HAND, FOOT AND MOUTH DISEASE: Primary | ICD-10-CM

## 2024-07-22 PROCEDURE — G0382 LEV 3 HOSP TYPE B ED VISIT: HCPCS | Performed by: NURSE PRACTITIONER

## 2024-07-22 PROCEDURE — S9083 URGENT CARE CENTER GLOBAL: HCPCS | Performed by: NURSE PRACTITIONER

## 2024-07-22 RX ORDER — AMOXICILLIN 500 MG/1
500 CAPSULE ORAL EVERY 8 HOURS SCHEDULED
Qty: 21 CAPSULE | Refills: 0 | Status: SHIPPED | OUTPATIENT
Start: 2024-07-22 | End: 2024-07-29

## 2024-07-22 NOTE — LETTER
July 22, 2024     Patient: Chip Richardson   YOB: 1985   Date of Visit: 7/22/2024       To Whom it May Concern:    Chip Richardson was seen in my clinic on 7/22/2024. He may return to work on 07/23/2024 .    If you have any questions or concerns, please don't hesitate to call.         Sincerely,          MEGHAN Hyatt        CC: No Recipients

## 2024-07-22 NOTE — TELEPHONE ENCOUNTER
Left voicemail for patient to callback to confirm if he's established care closer to home or to make the follow up appointment with Ady.  Thanks

## 2024-07-22 NOTE — PROGRESS NOTES
[facility]        NAME: Chip Richardson is a 38 y.o. male  : 1985    MRN: [id number]  DATE: 2024  TIME: 8:44 AM    Assessment and Plan   Hand, foot and mouth disease [B08.4]  1. Hand, foot and mouth disease        2. Infection of lip  amoxicillin (AMOXIL) 500 mg capsule            Patient Instructions     No fever or other symptoms  Antibiotics for infected lesion in the left lower lip  Follow up with PCP in 3-5 days.  Proceed to  ER if symptoms worsen.    If tests have been performed at [facility], our office will contact you with results if changes need to be made to the care plan discussed with you at the visit.  You can review your full results on [facility].    Chief Complaint     Chief Complaint   Patient presents with    Rash     Pt c/o blisters on mouth and rash on hands that started roughly 2 days ago          History of Present Illness       HPI  Reports rash on the hands and mouth. Duration x 2 days. Denies fever, loss of appetite, fatigue or body aches.     Review of Systems   Review of Systems   Constitutional:  Negative for appetite change and fever.   HENT:  Negative for congestion, postnasal drip and sore throat.    Respiratory:  Negative for cough and shortness of breath.    Gastrointestinal:  Negative for diarrhea and vomiting.   Skin:  Positive for rash.   Neurological:  Negative for headaches.         Current Medications       Current Outpatient Medications:     amoxicillin (AMOXIL) 500 mg capsule, Take 1 capsule (500 mg total) by mouth every 8 (eight) hours for 7 days, Disp: 21 capsule, Rfl: 0    cholecalciferol (VITAMIN D3) 1,000 units tablet, Take 2 tablets (2,000 Units total) by mouth daily, Disp: 28 tablet, Rfl: 0    Continuous Blood Gluc Sensor (FreeStyle Vikki 3 Sensor) MISC, Use daily as directed for CGM - Change every 14 days, Disp: 6 each, Rfl: 1    Insulin Glargine Solostar (Lantus SoloStar) 100 UNIT/ML SOPN, Inject 30 units QHS, Disp: 15 mL, Rfl: 1     Insulin Pen Needle (BD Pen Needle Rosa U/F) 32G X 4 MM MISC, Use 4 times daily, Disp: 400 each, Rfl: 0    insuln lispro (HumaLOG KwikPen) 200 units/mL CONCENTRATED U-200 injection pen, Inject 8 units with breakfast, lunch and dinner +Scale (45u max daily dose), Disp: 15 mL, Rfl: 1    ascorbic acid (VITAMIN C) 1000 MG tablet, Take 1 tablet (1,000 mg total) by mouth every 12 (twelve) hours for 8 doses, Disp: 8 tablet, Rfl: 0    cyclobenzaprine (FLEXERIL) 10 mg tablet, Take 1 tablet (10 mg total) by mouth 3 (three) times a day as needed for muscle spasms (Patient not taking: Reported on 6/1/2023), Disp: 21 tablet, Rfl: 0    ibuprofen (MOTRIN) 600 mg tablet, Take 1 tablet (600 mg total) by mouth every 8 (eight) hours as needed for mild pain (Patient not taking: Reported on 6/15/2023), Disp: 21 tablet, Rfl: 0    naproxen (Naprosyn) 500 mg tablet, Take 1 tablet (500 mg total) by mouth 2 (two) times a day with meals (Patient not taking: Reported on 6/15/2023), Disp: 30 tablet, Rfl: 0    triamcinolone (KENALOG) 0.1 % oral topical paste, Apply 1 application. topically 2 (two) times a day (Patient not taking: Reported on 6/15/2023), Disp: 5 g, Rfl: 0    Current Allergies     Allergies as of 07/22/2024    (No Known Allergies)            The following portions of the patient's history were reviewed and updated as appropriate: allergies, current medications, past family history, past medical history, past social history, past surgical history and problem list.     Past Medical History:   Diagnosis Date    Diabetes (HCC)     Thyromegaly 7/8/2024       History reviewed. No pertinent surgical history.    History reviewed. No pertinent family history.      Medications have been verified.        Objective   /86   Pulse 72   Temp (!) 97.3 °F (36.3 °C) (Tympanic)   Resp 18   SpO2 99%   No LMP for male patient.       Physical Exam     Physical Exam  Constitutional:       General: He is not in acute distress.  Cardiovascular:       Rate and Rhythm: Regular rhythm.      Heart sounds: Normal heart sounds.   Pulmonary:      Effort: Pulmonary effort is normal.      Breath sounds: Normal breath sounds.   Skin:     Findings: Rash (oval lesions on the palms and mouth. some on the lips. Erythematous and slightly swollen lesions in the left lower lip) present.

## 2024-08-07 PROBLEM — Z13.29 THYROID DISORDER SCREENING: Status: RESOLVED | Noted: 2024-07-08 | Resolved: 2024-08-07

## 2024-10-01 DIAGNOSIS — E10.65 TYPE 1 DIABETES MELLITUS WITH HYPERGLYCEMIA (HCC): Primary | ICD-10-CM

## 2024-10-01 RX ORDER — BLOOD-GLUCOSE SENSOR
EACH MISCELLANEOUS
Qty: 6 EACH | Refills: 1 | Status: SHIPPED | OUTPATIENT
Start: 2024-10-01

## 2024-10-24 DIAGNOSIS — E10.65 TYPE 1 DIABETES MELLITUS WITH HYPERGLYCEMIA (HCC): ICD-10-CM

## 2024-10-24 RX ORDER — BLOOD-GLUCOSE SENSOR
EACH MISCELLANEOUS
Qty: 6 EACH | Refills: 1 | Status: SHIPPED | OUTPATIENT
Start: 2024-10-24

## 2024-11-18 DIAGNOSIS — E10.65 TYPE 1 DIABETES MELLITUS WITH HYPERGLYCEMIA (HCC): ICD-10-CM

## 2024-11-19 RX ORDER — INSULIN LISPRO 200 [IU]/ML
INJECTION, SOLUTION SUBCUTANEOUS
Qty: 15 ML | Refills: 1 | Status: SHIPPED | OUTPATIENT
Start: 2024-11-19

## 2025-01-08 DIAGNOSIS — E10.65 TYPE 1 DIABETES MELLITUS WITH HYPERGLYCEMIA (HCC): ICD-10-CM

## 2025-01-09 RX ORDER — ACYCLOVIR 800 MG/1
TABLET ORAL
Qty: 6 EACH | Refills: 1 | Status: SHIPPED | OUTPATIENT
Start: 2025-01-09

## 2025-04-02 DIAGNOSIS — E10.65 TYPE 1 DIABETES MELLITUS WITH HYPERGLYCEMIA (HCC): ICD-10-CM

## 2025-04-02 RX ORDER — INSULIN LISPRO 200 [IU]/ML
INJECTION, SOLUTION SUBCUTANEOUS
Qty: 15 ML | Refills: 1 | Status: SHIPPED | OUTPATIENT
Start: 2025-04-02

## 2025-05-07 DIAGNOSIS — E10.65 TYPE 1 DIABETES MELLITUS WITH HYPERGLYCEMIA (HCC): Primary | ICD-10-CM

## 2025-05-07 DIAGNOSIS — E11.10 DIABETIC KETOACIDOSIS WITHOUT COMA (HCC): ICD-10-CM

## 2025-05-07 RX ORDER — INSULIN GLARGINE 100 [IU]/ML
INJECTION, SOLUTION SUBCUTANEOUS
Qty: 15 ML | Refills: 0 | Status: CANCELLED | OUTPATIENT
Start: 2025-05-07

## 2025-05-09 ENCOUNTER — TELEPHONE (OUTPATIENT)
Age: 40
End: 2025-05-09

## 2025-05-09 DIAGNOSIS — E10.65 TYPE 1 DIABETES MELLITUS WITH HYPERGLYCEMIA (HCC): ICD-10-CM

## 2025-05-09 RX ORDER — INSULIN GLARGINE-YFGN 100 [IU]/ML
30 INJECTION, SOLUTION SUBCUTANEOUS
Qty: 15 ML | Refills: 0 | OUTPATIENT
Start: 2025-05-09

## 2025-05-09 RX ORDER — INSULIN GLARGINE-YFGN 100 [IU]/ML
30 INJECTION, SOLUTION SUBCUTANEOUS
Qty: 15 ML | Refills: 0 | Status: SHIPPED | OUTPATIENT
Start: 2025-05-09

## 2025-05-09 NOTE — TELEPHONE ENCOUNTER
Left voicemail for patient  Denae sent a courtesy refill of insulin to his pharmacy.    He will need to schedule a follow up appointment -     He has been seen in [place] but if [place] wants to come to [place] so it is closer, that is fine.

## 2025-05-31 DIAGNOSIS — E10.65 TYPE 1 DIABETES MELLITUS WITH HYPERGLYCEMIA (HCC): ICD-10-CM

## 2025-06-02 RX ORDER — ACYCLOVIR 800 MG/1
TABLET ORAL
Qty: 6 EACH | Refills: 0 | Status: SHIPPED | OUTPATIENT
Start: 2025-06-02

## 2025-06-02 NOTE — TELEPHONE ENCOUNTER
Left message on pts voicemail that Domenica Galan refilled his sensors and he needs to make an office visit. And I left a message that we have openings tomm with Domenica Galan

## 2025-06-03 ENCOUNTER — OFFICE VISIT (OUTPATIENT)
Dept: ENDOCRINOLOGY | Facility: CLINIC | Age: 40
End: 2025-06-03
Payer: COMMERCIAL

## 2025-06-03 VITALS
OXYGEN SATURATION: 98 % | HEART RATE: 80 BPM | DIASTOLIC BLOOD PRESSURE: 86 MMHG | WEIGHT: 221 LBS | HEIGHT: 67 IN | BODY MASS INDEX: 34.69 KG/M2 | SYSTOLIC BLOOD PRESSURE: 130 MMHG

## 2025-06-03 DIAGNOSIS — E01.0 THYROMEGALY: ICD-10-CM

## 2025-06-03 DIAGNOSIS — E10.65 TYPE 1 DIABETES MELLITUS WITH HYPERGLYCEMIA (HCC): Primary | ICD-10-CM

## 2025-06-03 DIAGNOSIS — E78.2 MIXED HYPERLIPIDEMIA: ICD-10-CM

## 2025-06-03 PROCEDURE — 99214 OFFICE O/P EST MOD 30 MIN: CPT | Performed by: PHYSICIAN ASSISTANT

## 2025-06-03 PROCEDURE — 95251 CONT GLUC MNTR ANALYSIS I&R: CPT | Performed by: PHYSICIAN ASSISTANT

## 2025-06-03 NOTE — PROGRESS NOTES
Name: Chip Richardson      : 1985      MRN: [id number]  Encounter Provider: Domenica Galan PA-C  Encounter Date: 6/3/2025   Encounter department: [facility] [place]    CC: DM  Assessment & Plan  Type 1 diabetes mellitus with hyperglycemia (HCC)  HGA1C 7.8%. Overdue.  Treatment regimen: Blood glucose levels are in variable range likely due to inconsistent carbohydrate intake and using the scale inappropriately.  Reviewed the scale with the patient and included it in the after visit summary.  Patient would benefit from using an insulin pump.  Referred for flexible insulin therapy and pump class.  For now continue current treatment and call to download CGM in 2 weeks  Discussed intensive insulin regimen does increase risk for hypoglycemia. Episodes of hypoglycemia can lead to permanent disability and death.  Discussed risks/complications associated with uncontrolled diabetes.    Advised to adhere to diabetic diet, and recommended staying active/exercising routinely as tolerated.  Keep carbohydrates consistent to limit blood glucose fluctuations.  Advised to call if blood sugars less than 70 mg/dl or over 300 mg/dl.   Check blood glucose 3+ times a day  Discussed symptoms and treatment of hypoglycemia.   Discussed use of CGM to collect additional blood glucose data to reveal trends and patterns that can be used to optimize treatment plan.   Referred to diabetes/nutrition education.   Recommended routine follow-up with podiatry and ophthalmology.   Call to download CGM in 2 weeks.    Ordered blood work to complete now and prior to next visit.    Lab Results   Component Value Date    HGBA1C 7.8 (A) 2024       Orders:    Hemoglobin A1C; Future    Albumin / creatinine urine ratio; Future    Comprehensive metabolic panel; Future    Hemoglobin A1C; Future    Albumin / creatinine urine ratio; Future    Basic metabolic panel; Future    Ambulatory referral to Diabetic  Education; Future    Mixed hyperlipidemia  LDL previously 116  Not on statin therapy   Recommended healthy diet and routine exercise as tolerated  Orders:    Lipid panel; Future    Thyromegaly  Check thyroid US  Orders:    US thyroid; Future          History of Present Illness     Chip Richardson is a 38 y.o. male with a history of type 1 diabetes with long term use of insulin. Diagnosed in July 2021.Diabetes course has been stable. Complications of DM: DKA.  Denies recent severe hypoglycemic or severe hyperglycemic episodes. Denies any issues with his current regimen. Home glucose monitoring: are performed regularly, 5+ times a day      Average glucose 182 mg/dl  In target range 45%, above range 52%, below range 3%     Current regimen: Lantus 30 units QHS, Humalog 8 units TID with meals plus scale (not using scale, just estimating)  compliant most of the time, denies any side effects from medications.   Injects in: abdomen. Rotates sites: Yes  Hypoglycemic episodes: Yes, rare (occasionally if he does a correction in the morning - does not use a particular scale)  H/o of hypoglycemia causing hospitalization or Intervention such as glucagon injection or ambulance call : Yes  Hypoglycemia symptoms: jitteriness, sweaty, and weakness  Treatment of hypoglycemia: candy. Discussed treatment.      Medic alert tag: recommended: Yes     Diabetes education: Yes   Diet: 2 meals per day, 1 snack per day. Timing of meals is predictable.   Diabetic diet compliance: compliant some of the time  Activity: Daily activity is predictable: Yes. He exercises 5 days a week     Not on ACE inhibitor/ARB  Not on statin   Thyroid disorders: No. Previously US ordered for thyromegaly but he never completed it.   History of pancreatitis: No      Review of Systems   Constitutional:  Negative for activity change, appetite change, fatigue and unexpected weight change.   HENT:  Negative for trouble swallowing.    Eyes:  Negative for visual disturbance.  "  Respiratory:  Negative for shortness of breath.    Cardiovascular:  Negative for chest pain and palpitations.   Gastrointestinal:  Negative for constipation and diarrhea.   Endocrine: Negative for polydipsia and polyuria.   Musculoskeletal: Negative.    Skin:  Negative for wound.   Neurological:  Negative for numbness.   Psychiatric/Behavioral: Negative.      as per HPI    Medications Ordered Prior to Encounter[1]      Medical History Reviewed by provider this encounter:  Tobacco  Allergies  Meds  Problems  Med Hx  Surg Hx  Fam Hx     .    Objective   /86 (BP Location: Left arm, Patient Position: Sitting, Cuff Size: Large)   Pulse 80   Ht 5' 7\" (1.702 m)   Wt 100 kg (221 lb)   SpO2 98%   BMI 34.61 kg/m²      Body mass index is 34.61 kg/m².  Wt Readings from Last 3 Encounters:   06/03/25 100 kg (221 lb)   07/08/24 98.5 kg (217 lb 3.2 oz)   11/08/23 103 kg (227 lb)     Physical Exam  Vitals and nursing note reviewed.   Constitutional:       Appearance: He is well-developed.   HENT:      Head: Normocephalic.     Eyes:      General: No scleral icterus.    Neck:      Thyroid: Thyromegaly present.     Cardiovascular:      Rate and Rhythm: Normal rate and regular rhythm.      Pulses:           Radial pulses are 2+ on the right side and 2+ on the left side.      Heart sounds: No murmur heard.  Pulmonary:      Effort: Pulmonary effort is normal. No respiratory distress.      Breath sounds: Normal breath sounds. No wheezing.     Musculoskeletal:      Cervical back: Neck supple.     Skin:     General: Skin is warm and dry.     Neurological:      Mental Status: He is alert.       Last Eye Exam: Not on file  Last Foot Exam: 06/15/2023  Health Maintenance   Topic Date Due    Diabetic Foot Exam  06/15/2024    Diabetic Eye Exam  07/08/2025 (Originally 8/16/1997)         Labs: I have reviewed pertinent labs including:   Component      Latest Ref Rng 3/10/2023 6/15/2023 7/8/2024 7/9/2024   Sodium      135 - 147 " mmol/L 138    138    Potassium      3.5 - 5.3 mmol/L 4.5    3.7    Chloride      96 - 108 mmol/L 105    101    Carbon Dioxide      21 - 32 mmol/L 29    26    ANION GAP      4 - 13 mmol/L 4    11    BUN      5 - 25 mg/dL 13    11    Creatinine      0.60 - 1.30 mg/dL 1.00    1.04    GLUCOSE, FASTING      65 - 99 mg/dL 161 (H)    79    Calcium      8.4 - 10.2 mg/dL 8.8    9.1    GFR, Calculated      ml/min/1.73sq m 97    91    Cholesterol      See Comment mg/dL 177    207 (H)    Triglycerides      See Comment mg/dL 41    70    HDL      >=40 mg/dL 60    77    LDL Calculated      0 - 100 mg/dL 109 (H)    116 (H)    Non-HDL Cholesterol      mg/dl 117    130    EXT Creatinine Urine      Reference range not established. mg/dL 185.0    288.4    Albumin,U,Random      <20.0 mg/L 10.1    12.8    Albumin Creat Ratio      0 - 30 mg/g creatinine 5    4    Hemoglobin A1C      <=6.5  9.6 (H)  7.3 !  7.8 !     eAG, EST AVG Glucose      mg/dl 229       VITAMIN D, 25-HYDROXY      30.0 - 100.0 ng/mL 19.5 (L)    30.3    FREE T4      0.61 - 1.12 ng/dL    0.81    TSH 3RD GENERATON      0.450 - 4.500 uIU/mL    0.801       Legend:  (H) High  (L) Low  ! Abnormal    Patient Instructions     INSULIN DOSAGE INSTRUCTIONS    Name: Chip Richardson                        : 1985  MRN #: [id number]    Your Current Insulin  and dose is: Before Breakfast Before Lunch Before Evening Meal Bedtime     Humalog Insulin   8 units     8 units   8 units    Regular, Apidra, Humalog orNovolog Sliding Scale:   <80              151-200 +1  +1 +1    201-250 +2 +2 +2 +   251-300 +3 +3 +3 +   301-350 +4 +4 +4 +   >350 +5 +5 +5 +     Lantus Insulin    30 units     Additional Instructions:   Please test your blood sugar:  _4_ Times per day.  X_ Before Breakfast                _ Alternate Testing  X_ Before Lunch                _ 2 Hours After  Meal  X_ Before Evening Meal               _ 3 a.m.  x_ Before Bedtime Snack       Today's Date:  6/3/2025    Hypoglycemia instructions   Chip Richardson  6/3/2025  [id number]    Low Blood Sugar    Steps to treat low blood sugar.    1. Test blood sugar if you have symptoms of low blood sugar:   Low Blood Sugar Symptoms:  o Sweaty  o Dizzy  o Rapid heartbeat  o Shaky  o Bad mood  o Hungry      2. Treat blood sugar less than 70 with 15 grams of fast-acting carbohydrate:   Examples of 15 grams Fast-Acting Carbohydrate:  o 4 oz juice  o 4 oz regular soda  o 3-4 glucose tablets (chew)  o 3-4 hard candies (chew)          3.  Wait 15 minutes and test your blood sugar again     4. If blood sugar is less than 100, repeat steps 2-3.    5. When your blood sugar is 100 or more, eat a snack if it will be longer than one hour until your next meal. The snack should be 15 grams of carbohydrate and a protein:   Examples of snacks:  o ½ sandwich  o 6 crackers with cheese  o Piece of fruit with cheese or peanut butter  o 6 crackers with peanut butter      Discussed with the patient and all questioned fully answered. He will call me if any problems arise.    Administrative Statements   I have spent a total time of 30 minutes in caring for this patient on the day of the visit/encounter including Importance of tx compliance, Documenting in the medical record, Reviewing/placing orders in the medical record (including tests, medications, and/or procedures), and Obtaining or reviewing history  .         [1]   Current Outpatient Medications on File Prior to Visit   Medication Sig Dispense Refill    Continuous Glucose Sensor (FreeStyle Vikki 3 Plus Sensor) MISC Change every 15 days 6 each 1    Continuous Glucose Sensor (FreeStyle Vikki 3 Sensor) MISC CHANGE EVERY 14 DAYS 6 each 0    Insulin Glargine-yfgn 100 UNIT/ML SOLN Inject 0.3 mL (30 Units total) under the skin daily at bedtime 15 mL 0    Insulin Pen Needle (BD Pen Needle Rosa U/F) 32G X 4 MM MISC Use 4 times daily 400 each 0    insuln lispro (HumaLOG KwikPen) 200 units/mL  CONCENTRATED U-200 injection pen INJECT 8 UNITS WITH BREAKFAST, LUNCH AND DINNER + SCALE (45 U MAX DAILY DOSE) 15 mL 1    ascorbic acid (VITAMIN C) 1000 MG tablet Take 1 tablet (1,000 mg total) by mouth every 12 (twelve) hours for 8 doses 8 tablet 0    cholecalciferol (VITAMIN D3) 1,000 units tablet Take 2 tablets (2,000 Units total) by mouth daily 28 tablet 0    cyclobenzaprine (FLEXERIL) 10 mg tablet Take 1 tablet (10 mg total) by mouth 3 (three) times a day as needed for muscle spasms (Patient not taking: Reported on 6/1/2023) 21 tablet 0    ibuprofen (MOTRIN) 600 mg tablet Take 1 tablet (600 mg total) by mouth every 8 (eight) hours as needed for mild pain (Patient not taking: Reported on 6/15/2023) 21 tablet 0    naproxen (Naprosyn) 500 mg tablet Take 1 tablet (500 mg total) by mouth 2 (two) times a day with meals (Patient not taking: Reported on 6/15/2023) 30 tablet 0    triamcinolone (KENALOG) 0.1 % oral topical paste Apply 1 application. topically 2 (two) times a day (Patient not taking: Reported on 6/15/2023) 5 g 0     No current facility-administered medications on file prior to visit.

## 2025-06-03 NOTE — ASSESSMENT & PLAN NOTE
HGA1C 7.8%. Overdue.  Treatment regimen: Blood glucose levels are in variable range likely due to inconsistent carbohydrate intake and using the scale inappropriately.  Reviewed the scale with the patient and included it in the after visit summary.  Patient would benefit from using an insulin pump.  Referred for flexible insulin therapy and pump class.  For now continue current treatment and call to download CGM in 2 weeks  Discussed intensive insulin regimen does increase risk for hypoglycemia. Episodes of hypoglycemia can lead to permanent disability and death.  Discussed risks/complications associated with uncontrolled diabetes.    Advised to adhere to diabetic diet, and recommended staying active/exercising routinely as tolerated.  Keep carbohydrates consistent to limit blood glucose fluctuations.  Advised to call if blood sugars less than 70 mg/dl or over 300 mg/dl.   Check blood glucose 3+ times a day  Discussed symptoms and treatment of hypoglycemia.   Discussed use of CGM to collect additional blood glucose data to reveal trends and patterns that can be used to optimize treatment plan.   Referred to diabetes/nutrition education.   Recommended routine follow-up with podiatry and ophthalmology.   Call to download CGM in 2 weeks.    Ordered blood work to complete now and prior to next visit.    Lab Results   Component Value Date    HGBA1C 7.8 (A) 07/08/2024       Orders:    Hemoglobin A1C; Future    Albumin / creatinine urine ratio; Future    Comprehensive metabolic panel; Future    Hemoglobin A1C; Future    Albumin / creatinine urine ratio; Future    Basic metabolic panel; Future    Ambulatory referral to Diabetic Education; Future

## 2025-06-03 NOTE — ASSESSMENT & PLAN NOTE
LDL previously 116  Not on statin therapy   Recommended healthy diet and routine exercise as tolerated  Orders:    Lipid panel; Future

## 2025-06-03 NOTE — PATIENT INSTRUCTIONS
INSULIN DOSAGE INSTRUCTIONS    Name: Chip Richardson                        : 1985  MRN #: [id number]    Your Current Insulin  and dose is: Before Breakfast Before Lunch Before Evening Meal Bedtime     Humalog Insulin   8 units     8 units   8 units    Regular, Apidra, Humalog orNovolog Sliding Scale:   <80              151-200 +1  +1 +1    201-250 +2 +2 +2 +   251-300 +3 +3 +3 +   301-350 +4 +4 +4 +   >350 +5 +5 +5 +     Lantus Insulin    30 units     Additional Instructions:   Please test your blood sugar:  _4_ Times per day.  X_ Before Breakfast                _ Alternate Testing  X_ Before Lunch                _ 2 Hours After  Meal  X_ Before Evening Meal               _ 3 a.m.  x_ Before Bedtime Snack       Today's Date: 6/3/2025    Hypoglycemia instructions   Chip Richardson  6/3/2025  [id number]    Low Blood Sugar    Steps to treat low blood sugar.    1. Test blood sugar if you have symptoms of low blood sugar:   Low Blood Sugar Symptoms:  o Sweaty  o Dizzy  o Rapid heartbeat  o Shaky  o Bad mood  o Hungry      2. Treat blood sugar less than 70 with 15 grams of fast-acting carbohydrate:   Examples of 15 grams Fast-Acting Carbohydrate:  o 4 oz juice  o 4 oz regular soda  o 3-4 glucose tablets (chew)  o 3-4 hard candies (chew)          3.  Wait 15 minutes and test your blood sugar again     4. If blood sugar is less than 100, repeat steps 2-3.    5. When your blood sugar is 100 or more, eat a snack if it will be longer than one hour until your next meal. The snack should be 15 grams of carbohydrate and a protein:   Examples of snacks:  o ½ sandwich  o 6 crackers with cheese  o Piece of fruit with cheese or peanut butter  o 6 crackers with peanut butter

## 2025-07-18 DIAGNOSIS — E10.65 TYPE 1 DIABETES MELLITUS WITH HYPERGLYCEMIA (HCC): ICD-10-CM

## 2025-07-18 RX ORDER — INSULIN LISPRO 200 [IU]/ML
INJECTION, SOLUTION SUBCUTANEOUS
Qty: 15 ML | Refills: 2 | Status: SHIPPED | OUTPATIENT
Start: 2025-07-18

## 2025-07-18 RX ORDER — ACYCLOVIR 800 MG/1
TABLET ORAL
Qty: 6 EACH | Refills: 1 | Status: SHIPPED | OUTPATIENT
Start: 2025-07-18

## 2025-08-06 DIAGNOSIS — E10.65 TYPE 1 DIABETES MELLITUS WITH HYPERGLYCEMIA (HCC): ICD-10-CM

## 2025-08-07 ENCOUNTER — APPOINTMENT (OUTPATIENT)
Dept: LAB | Facility: CLINIC | Age: 40
End: 2025-08-07
Payer: COMMERCIAL

## 2025-08-07 LAB
ANION GAP SERPL CALCULATED.3IONS-SCNC: 9 MMOL/L (ref 4–13)
BUN SERPL-MCNC: 13 MG/DL (ref 5–25)
CALCIUM SERPL-MCNC: 9.1 MG/DL (ref 8.4–10.2)
CHLORIDE SERPL-SCNC: 103 MMOL/L (ref 96–108)
CO2 SERPL-SCNC: 26 MMOL/L (ref 21–32)
CREAT SERPL-MCNC: 0.89 MG/DL (ref 0.6–1.3)
CREAT UR-MCNC: 243.6 MG/DL
EST. AVERAGE GLUCOSE BLD GHB EST-MCNC: 189 MG/DL
GFR SERPL CREATININE-BSD FRML MDRD: 109 ML/MIN/1.73SQ M
GLUCOSE SERPL-MCNC: 118 MG/DL (ref 65–140)
HBA1C MFR BLD: 8.2 %
MICROALBUMIN UR-MCNC: 10.4 MG/L
MICROALBUMIN/CREAT 24H UR: 4 MG/G CREATININE (ref 0–30)
POTASSIUM SERPL-SCNC: 3.8 MMOL/L (ref 3.5–5.3)
SODIUM SERPL-SCNC: 138 MMOL/L (ref 135–147)

## 2025-08-07 RX ORDER — ACYCLOVIR 800 MG/1
TABLET ORAL
Qty: 6 EACH | Refills: 0 | Status: SHIPPED | OUTPATIENT
Start: 2025-08-07

## 2025-08-11 ENCOUNTER — RESULTS FOLLOW-UP (OUTPATIENT)
Dept: ENDOCRINOLOGY | Facility: CLINIC | Age: 40
End: 2025-08-11

## 2025-08-18 ENCOUNTER — HOSPITAL ENCOUNTER (OUTPATIENT)
Dept: ULTRASOUND IMAGING | Facility: HOSPITAL | Age: 40
Discharge: HOME | End: 2025-08-18
Payer: COMMERCIAL

## 2025-08-18 DIAGNOSIS — E01.0 THYROMEGALY: ICD-10-CM

## 2025-08-18 PROCEDURE — 76536 US EXAM OF HEAD AND NECK: CPT
